# Patient Record
Sex: MALE | Race: OTHER | URBAN - METROPOLITAN AREA
[De-identification: names, ages, dates, MRNs, and addresses within clinical notes are randomized per-mention and may not be internally consistent; named-entity substitution may affect disease eponyms.]

---

## 2022-04-25 ENCOUNTER — OFFICE VISIT (OUTPATIENT)
Dept: FAMILY MEDICINE CLINIC | Facility: CLINIC | Age: 62
End: 2022-04-25
Payer: COMMERCIAL

## 2022-04-25 VITALS
SYSTOLIC BLOOD PRESSURE: 118 MMHG | HEART RATE: 70 BPM | OXYGEN SATURATION: 99 % | RESPIRATION RATE: 18 BRPM | WEIGHT: 166 LBS | TEMPERATURE: 97 F | DIASTOLIC BLOOD PRESSURE: 72 MMHG | HEIGHT: 68 IN | BODY MASS INDEX: 25.16 KG/M2

## 2022-04-25 DIAGNOSIS — Z12.11 SCREENING FOR COLON CANCER: ICD-10-CM

## 2022-04-25 DIAGNOSIS — Z12.5 SCREENING FOR MALIGNANT NEOPLASM OF PROSTATE: ICD-10-CM

## 2022-04-25 DIAGNOSIS — Z11.59 ENCOUNTER FOR HEPATITIS C SCREENING TEST FOR LOW RISK PATIENT: ICD-10-CM

## 2022-04-25 DIAGNOSIS — E11.65 TYPE 2 DIABETES MELLITUS WITH HYPERGLYCEMIA, WITH LONG-TERM CURRENT USE OF INSULIN (HCC): ICD-10-CM

## 2022-04-25 DIAGNOSIS — E78.00 PURE HYPERCHOLESTEROLEMIA: ICD-10-CM

## 2022-04-25 DIAGNOSIS — Z00.00 WELLNESS EXAMINATION: Primary | ICD-10-CM

## 2022-04-25 DIAGNOSIS — Z79.4 TYPE 2 DIABETES MELLITUS WITH HYPERGLYCEMIA, WITH LONG-TERM CURRENT USE OF INSULIN (HCC): ICD-10-CM

## 2022-04-25 DIAGNOSIS — R25.2 LEG CRAMPS: ICD-10-CM

## 2022-04-25 DIAGNOSIS — I10 ESSENTIAL HYPERTENSION: ICD-10-CM

## 2022-04-25 DIAGNOSIS — I73.9 PERIPHERAL VASCULAR DISEASE (HCC): ICD-10-CM

## 2022-04-25 PROBLEM — U07.1 COVID-19 VIRUS DETECTED: Status: ACTIVE | Noted: 2022-04-25

## 2022-04-25 PROBLEM — E10.9 TYPE 1 DIABETES MELLITUS WITHOUT COMPLICATION (HCC): Status: ACTIVE | Noted: 2022-04-25

## 2022-04-25 PROBLEM — K13.21: Status: ACTIVE | Noted: 2022-04-25

## 2022-04-25 PROBLEM — R53.83 FATIGUE: Status: RESOLVED | Noted: 2022-04-25 | Resolved: 2022-04-25

## 2022-04-25 PROBLEM — K13.21: Status: RESOLVED | Noted: 2022-04-25 | Resolved: 2022-04-25

## 2022-04-25 PROBLEM — R53.83 FATIGUE: Status: ACTIVE | Noted: 2022-04-25

## 2022-04-25 PROBLEM — U07.1 COVID-19 VIRUS DETECTED: Status: RESOLVED | Noted: 2022-04-25 | Resolved: 2022-04-25

## 2022-04-25 PROBLEM — N52.9 MALE ERECTILE DISORDER: Status: ACTIVE | Noted: 2022-04-25

## 2022-04-25 RX ORDER — HUMAN INSULIN 100 [IU]/ML
20 INJECTION, SUSPENSION SUBCUTANEOUS
COMMUNITY

## 2022-04-25 RX ORDER — ASPIRIN 81 MG/1
81 TABLET, CHEWABLE ORAL DAILY
COMMUNITY

## 2022-04-25 RX ORDER — FLASH GLUCOSE SENSOR
1 KIT MISCELLANEOUS DAILY
Qty: 6 EACH | Refills: 1 | Status: SHIPPED | OUTPATIENT
Start: 2022-04-25

## 2022-04-25 RX ORDER — ROSUVASTATIN CALCIUM 5 MG/1
TABLET, COATED ORAL
COMMUNITY
Start: 2022-02-06

## 2022-04-25 RX ORDER — OMEGA-3S/DHA/EPA/FISH OIL/D3 1150-1000
LIQUID (ML) ORAL DAILY
COMMUNITY

## 2022-04-25 RX ORDER — MULTIVIT-MIN/FOLIC ACID/LUTEIN 400-250MCG
1 TABLET,CHEWABLE ORAL DAILY
COMMUNITY

## 2022-04-25 RX ORDER — LISINOPRIL 10 MG/1
TABLET ORAL
COMMUNITY
Start: 2022-02-06

## 2022-04-25 RX ORDER — FLASH GLUCOSE SCANNING READER
1 EACH MISCELLANEOUS DAILY
Qty: 1 EACH | Refills: 0 | Status: SHIPPED | OUTPATIENT
Start: 2022-04-25

## 2022-04-26 ENCOUNTER — TELEPHONE (OUTPATIENT)
Dept: ENDOCRINOLOGY CLINIC | Facility: CLINIC | Age: 62
End: 2022-04-26

## 2022-04-26 RX ORDER — FLASH GLUCOSE SENSOR
1 KIT MISCELLANEOUS
Qty: 2 EACH | Refills: 11 | Status: SHIPPED | OUTPATIENT
Start: 2022-04-26

## 2022-04-26 NOTE — TELEPHONE ENCOUNTER
Diabetes Education - LMTCB and sched MNT. Also - need to change order for sensors from 14 day to Gaona 2 sensors. It is confusing: their original system was called \"14 day\" but the Gaona 2 system (which also lasts x 14 days) is their latest.    Pended the sensors for Gaona 2 and cancelled 14 days sensors for your convenience.

## 2022-04-30 ENCOUNTER — LAB ENCOUNTER (OUTPATIENT)
Dept: LAB | Age: 62
End: 2022-04-30
Attending: FAMILY MEDICINE
Payer: COMMERCIAL

## 2022-04-30 DIAGNOSIS — E11.65 TYPE 2 DIABETES MELLITUS WITH HYPERGLYCEMIA, WITH LONG-TERM CURRENT USE OF INSULIN (HCC): ICD-10-CM

## 2022-04-30 DIAGNOSIS — Z11.59 ENCOUNTER FOR HEPATITIS C SCREENING TEST FOR LOW RISK PATIENT: ICD-10-CM

## 2022-04-30 DIAGNOSIS — Z12.5 SCREENING FOR MALIGNANT NEOPLASM OF PROSTATE: ICD-10-CM

## 2022-04-30 DIAGNOSIS — E78.00 PURE HYPERCHOLESTEROLEMIA: ICD-10-CM

## 2022-04-30 DIAGNOSIS — Z79.4 TYPE 2 DIABETES MELLITUS WITH HYPERGLYCEMIA, WITH LONG-TERM CURRENT USE OF INSULIN (HCC): ICD-10-CM

## 2022-04-30 DIAGNOSIS — R25.2 LEG CRAMPS: ICD-10-CM

## 2022-04-30 LAB
ALBUMIN SERPL-MCNC: 3.9 G/DL (ref 3.4–5)
ALBUMIN/GLOB SERPL: 1.4 {RATIO} (ref 1–2)
ALP LIVER SERPL-CCNC: 86 U/L
ALT SERPL-CCNC: 30 U/L
ANION GAP SERPL CALC-SCNC: 3 MMOL/L (ref 0–18)
AST SERPL-CCNC: 22 U/L (ref 15–37)
BASOPHILS # BLD AUTO: 0.04 X10(3) UL (ref 0–0.2)
BASOPHILS NFR BLD AUTO: 0.9 %
BILIRUB SERPL-MCNC: 0.9 MG/DL (ref 0.1–2)
BUN BLD-MCNC: 28 MG/DL (ref 7–18)
CALCIUM BLD-MCNC: 9.1 MG/DL (ref 8.5–10.1)
CHLORIDE SERPL-SCNC: 106 MMOL/L (ref 98–112)
CHOLEST SERPL-MCNC: 154 MG/DL (ref ?–200)
CO2 SERPL-SCNC: 30 MMOL/L (ref 21–32)
COMPLEXED PSA SERPL-MCNC: 0.87 NG/ML (ref ?–4)
CREAT BLD-MCNC: 1.12 MG/DL
CREAT UR-SCNC: 140 MG/DL
EOSINOPHIL # BLD AUTO: 0.18 X10(3) UL (ref 0–0.7)
EOSINOPHIL NFR BLD AUTO: 4 %
ERYTHROCYTE [DISTWIDTH] IN BLOOD BY AUTOMATED COUNT: 14.2 %
EST. AVERAGE GLUCOSE BLD GHB EST-MCNC: 194 MG/DL (ref 68–126)
FASTING PATIENT LIPID ANSWER: YES
FASTING STATUS PATIENT QL REPORTED: YES
GLOBULIN PLAS-MCNC: 2.8 G/DL (ref 2.8–4.4)
GLUCOSE BLD-MCNC: 253 MG/DL (ref 70–99)
HBA1C MFR BLD: 8.4 % (ref ?–5.7)
HCT VFR BLD AUTO: 47.6 %
HCV AB SERPL QL IA: NONREACTIVE
HDLC SERPL-MCNC: 57 MG/DL (ref 40–59)
HGB BLD-MCNC: 15.2 G/DL
IMM GRANULOCYTES # BLD AUTO: 0.01 X10(3) UL (ref 0–1)
IMM GRANULOCYTES NFR BLD: 0.2 %
LDLC SERPL CALC-MCNC: 80 MG/DL (ref ?–100)
LYMPHOCYTES # BLD AUTO: 1.29 X10(3) UL (ref 1–4)
LYMPHOCYTES NFR BLD AUTO: 28.5 %
MAGNESIUM SERPL-MCNC: 2 MG/DL (ref 1.6–2.6)
MCH RBC QN AUTO: 30 PG (ref 26–34)
MCHC RBC AUTO-ENTMCNC: 31.9 G/DL (ref 31–37)
MCV RBC AUTO: 94.1 FL
MICROALBUMIN UR-MCNC: 0.7 MG/DL
MICROALBUMIN/CREAT 24H UR-RTO: 5 UG/MG (ref ?–30)
MONOCYTES # BLD AUTO: 0.36 X10(3) UL (ref 0.1–1)
MONOCYTES NFR BLD AUTO: 7.9 %
NEUTROPHILS # BLD AUTO: 2.65 X10 (3) UL (ref 1.5–7.7)
NEUTROPHILS # BLD AUTO: 2.65 X10(3) UL (ref 1.5–7.7)
NEUTROPHILS NFR BLD AUTO: 58.5 %
NONHDLC SERPL-MCNC: 97 MG/DL (ref ?–130)
OSMOLALITY SERPL CALC.SUM OF ELEC: 302 MOSM/KG (ref 275–295)
PLATELET # BLD AUTO: 235 10(3)UL (ref 150–450)
POTASSIUM SERPL-SCNC: 5.7 MMOL/L (ref 3.5–5.1)
PROT SERPL-MCNC: 6.7 G/DL (ref 6.4–8.2)
RBC # BLD AUTO: 5.06 X10(6)UL
SODIUM SERPL-SCNC: 139 MMOL/L (ref 136–145)
TRIGL SERPL-MCNC: 91 MG/DL (ref 30–149)
TSI SER-ACNC: 1.39 MIU/ML (ref 0.36–3.74)
VLDLC SERPL CALC-MCNC: 14 MG/DL (ref 0–30)
WBC # BLD AUTO: 4.5 X10(3) UL (ref 4–11)

## 2022-04-30 PROCEDURE — 85025 COMPLETE CBC W/AUTO DIFF WBC: CPT

## 2022-04-30 PROCEDURE — 84443 ASSAY THYROID STIM HORMONE: CPT

## 2022-04-30 PROCEDURE — 82570 ASSAY OF URINE CREATININE: CPT

## 2022-04-30 PROCEDURE — 80061 LIPID PANEL: CPT

## 2022-04-30 PROCEDURE — 36415 COLL VENOUS BLD VENIPUNCTURE: CPT

## 2022-04-30 PROCEDURE — 82043 UR ALBUMIN QUANTITATIVE: CPT

## 2022-04-30 PROCEDURE — 83735 ASSAY OF MAGNESIUM: CPT

## 2022-04-30 PROCEDURE — 86803 HEPATITIS C AB TEST: CPT

## 2022-04-30 PROCEDURE — 80053 COMPREHEN METABOLIC PANEL: CPT

## 2022-04-30 PROCEDURE — 83036 HEMOGLOBIN GLYCOSYLATED A1C: CPT

## 2022-05-03 ENCOUNTER — TELEPHONE (OUTPATIENT)
Dept: FAMILY MEDICINE CLINIC | Facility: CLINIC | Age: 62
End: 2022-05-03

## 2022-05-03 NOTE — TELEPHONE ENCOUNTER
----- Message from Dallin Porter DO sent at 5/2/2022  4:32 PM CDT -----  Please notify the patient that his urine microalbumin, hapatitis c, lipid panel, thyroid test and blood count are normal.    His chemistry panel shows elevated glucose, potassium and BUN. He needs repeat BMP for recheck on all three as soon as possible. Increase water intake to help hydrate kidneys. HgbA1C is 8.4 which is too high. Increase Insulin NPH to 25 units daily and continue with sliding scale. Recheck HgbA1C in 3 months. Thanks.

## 2022-05-17 ENCOUNTER — LAB ENCOUNTER (OUTPATIENT)
Dept: LAB | Age: 62
End: 2022-05-17
Attending: FAMILY MEDICINE
Payer: COMMERCIAL

## 2022-05-17 DIAGNOSIS — R79.9 ELEVATED BUN: ICD-10-CM

## 2022-05-17 DIAGNOSIS — E87.5 HYPERKALEMIA: ICD-10-CM

## 2022-05-17 DIAGNOSIS — R73.9 HYPERGLYCEMIA: ICD-10-CM

## 2022-05-17 LAB
ANION GAP SERPL CALC-SCNC: 3 MMOL/L (ref 0–18)
BUN BLD-MCNC: 25 MG/DL (ref 7–18)
CALCIUM BLD-MCNC: 8.7 MG/DL (ref 8.5–10.1)
CHLORIDE SERPL-SCNC: 109 MMOL/L (ref 98–112)
CO2 SERPL-SCNC: 28 MMOL/L (ref 21–32)
CREAT BLD-MCNC: 1.1 MG/DL
FASTING STATUS PATIENT QL REPORTED: YES
GLUCOSE BLD-MCNC: 215 MG/DL (ref 70–99)
OSMOLALITY SERPL CALC.SUM OF ELEC: 301 MOSM/KG (ref 275–295)
POTASSIUM SERPL-SCNC: 5.3 MMOL/L (ref 3.5–5.1)
SODIUM SERPL-SCNC: 140 MMOL/L (ref 136–145)

## 2022-05-17 PROCEDURE — 80048 BASIC METABOLIC PNL TOTAL CA: CPT | Performed by: FAMILY MEDICINE

## 2022-05-18 ENCOUNTER — TELEPHONE (OUTPATIENT)
Dept: FAMILY MEDICINE CLINIC | Facility: CLINIC | Age: 62
End: 2022-05-18

## 2022-05-18 NOTE — TELEPHONE ENCOUNTER
----- Message from Tracy Arango DO sent at 5/17/2022  3:33 PM CDT -----  Please let the patient know his potassium has improved. It is still a little high at 5.3. Blood sugar was 215 and BUN has improved to 25. Continue to push fluids. Patient to make an appointment with the diabetic educator regarding use of his Freestyle valentine CGM and with his eye doctor. Call the office in 2 weeks with updated home BS readings after the increase in the NPH dosage.

## 2022-06-27 ENCOUNTER — TELEPHONE (OUTPATIENT)
Dept: FAMILY MEDICINE CLINIC | Facility: CLINIC | Age: 62
End: 2022-06-27

## 2022-06-27 PROBLEM — Z12.11 SPECIAL SCREENING FOR MALIGNANT NEOPLASM OF COLON: Status: ACTIVE | Noted: 2022-06-27

## 2022-06-27 NOTE — TELEPHONE ENCOUNTER
Colonoscopy report dated 6/27/2022    Normal colon. Internal hemorrhoids    Next colonoscopy in 10 years.

## 2022-07-18 ENCOUNTER — OFFICE VISIT (OUTPATIENT)
Dept: FAMILY MEDICINE CLINIC | Facility: CLINIC | Age: 62
End: 2022-07-18
Payer: COMMERCIAL

## 2022-07-18 VITALS
DIASTOLIC BLOOD PRESSURE: 62 MMHG | WEIGHT: 171 LBS | BODY MASS INDEX: 25.91 KG/M2 | HEART RATE: 68 BPM | TEMPERATURE: 98 F | SYSTOLIC BLOOD PRESSURE: 118 MMHG | OXYGEN SATURATION: 100 % | RESPIRATION RATE: 18 BRPM | HEIGHT: 68 IN

## 2022-07-18 DIAGNOSIS — Z79.4 TYPE 2 DIABETES MELLITUS WITHOUT COMPLICATION, WITH LONG-TERM CURRENT USE OF INSULIN (HCC): Primary | ICD-10-CM

## 2022-07-18 DIAGNOSIS — E78.00 PURE HYPERCHOLESTEROLEMIA: ICD-10-CM

## 2022-07-18 DIAGNOSIS — I10 ESSENTIAL HYPERTENSION: ICD-10-CM

## 2022-07-18 DIAGNOSIS — E87.5 HYPERKALEMIA: ICD-10-CM

## 2022-07-18 DIAGNOSIS — E11.9 TYPE 2 DIABETES MELLITUS WITHOUT COMPLICATION, WITH LONG-TERM CURRENT USE OF INSULIN (HCC): Primary | ICD-10-CM

## 2022-07-18 LAB
ALBUMIN SERPL-MCNC: 3.8 G/DL (ref 3.4–5)
ALBUMIN/GLOB SERPL: 1.4 {RATIO} (ref 1–2)
ALP LIVER SERPL-CCNC: 89 U/L
ALT SERPL-CCNC: 29 U/L
ANION GAP SERPL CALC-SCNC: 2 MMOL/L (ref 0–18)
AST SERPL-CCNC: 30 U/L (ref 15–37)
BILIRUB SERPL-MCNC: 0.3 MG/DL (ref 0.1–2)
BUN BLD-MCNC: 22 MG/DL (ref 7–18)
CALCIUM BLD-MCNC: 8.6 MG/DL (ref 8.5–10.1)
CARTRIDGE LOT#: 928 NUMERIC
CHLORIDE SERPL-SCNC: 109 MMOL/L (ref 98–112)
CO2 SERPL-SCNC: 28 MMOL/L (ref 21–32)
CREAT BLD-MCNC: 1.23 MG/DL
FASTING STATUS PATIENT QL REPORTED: NO
GLOBULIN PLAS-MCNC: 2.7 G/DL (ref 2.8–4.4)
GLUCOSE BLD-MCNC: 152 MG/DL (ref 70–99)
HEMOGLOBIN A1C: 7.6 % (ref 4.3–5.6)
OSMOLALITY SERPL CALC.SUM OF ELEC: 294 MOSM/KG (ref 275–295)
POTASSIUM SERPL-SCNC: 4.7 MMOL/L (ref 3.5–5.1)
PROT SERPL-MCNC: 6.5 G/DL (ref 6.4–8.2)
SODIUM SERPL-SCNC: 139 MMOL/L (ref 136–145)

## 2022-07-18 PROCEDURE — 80053 COMPREHEN METABOLIC PANEL: CPT | Performed by: FAMILY MEDICINE

## 2022-07-18 RX ORDER — FLASH GLUCOSE SENSOR
1 KIT MISCELLANEOUS CONTINUOUS
Qty: 1 EACH | Refills: 0 | Status: SHIPPED | OUTPATIENT
Start: 2022-07-18

## 2022-07-18 RX ORDER — LISINOPRIL 10 MG/1
10 TABLET ORAL DAILY
Qty: 90 TABLET | Refills: 1 | Status: SHIPPED | OUTPATIENT
Start: 2022-07-18

## 2022-07-18 RX ORDER — ROSUVASTATIN CALCIUM 5 MG/1
5 TABLET, COATED ORAL NIGHTLY
Qty: 90 TABLET | Refills: 1 | Status: SHIPPED | OUTPATIENT
Start: 2022-07-18

## 2022-07-18 NOTE — PATIENT INSTRUCTIONS
Hold your nighttime regular insulin for the next week. Let me know if you continue to have low blood sugars during the night over the next week. I will contact you with your test results. Keep your appointment with your eye doctor. Go for your second COVID booster. Consider getting your Shingles vaccine and your pneumonia vaccine. See me in 3 months or sooner if you continue to have problems with low blood sugars.

## 2022-10-17 ENCOUNTER — OFFICE VISIT (OUTPATIENT)
Dept: FAMILY MEDICINE CLINIC | Facility: CLINIC | Age: 62
End: 2022-10-17
Payer: COMMERCIAL

## 2022-10-17 VITALS
BODY MASS INDEX: 26.07 KG/M2 | HEART RATE: 70 BPM | SYSTOLIC BLOOD PRESSURE: 132 MMHG | RESPIRATION RATE: 16 BRPM | TEMPERATURE: 97 F | OXYGEN SATURATION: 98 % | WEIGHT: 172 LBS | DIASTOLIC BLOOD PRESSURE: 86 MMHG | HEIGHT: 68 IN

## 2022-10-17 DIAGNOSIS — M77.11 LATERAL EPICONDYLITIS OF RIGHT ELBOW: ICD-10-CM

## 2022-10-17 DIAGNOSIS — Z79.4 TYPE 2 DIABETES MELLITUS WITHOUT COMPLICATION, WITH LONG-TERM CURRENT USE OF INSULIN (HCC): Primary | ICD-10-CM

## 2022-10-17 DIAGNOSIS — I10 ESSENTIAL HYPERTENSION: ICD-10-CM

## 2022-10-17 DIAGNOSIS — E11.9 TYPE 2 DIABETES MELLITUS WITHOUT COMPLICATION, WITH LONG-TERM CURRENT USE OF INSULIN (HCC): Primary | ICD-10-CM

## 2022-10-17 DIAGNOSIS — E78.00 PURE HYPERCHOLESTEROLEMIA: ICD-10-CM

## 2022-10-17 DIAGNOSIS — Z23 NEED FOR VACCINATION AGAINST STREPTOCOCCUS PNEUMONIAE: ICD-10-CM

## 2022-10-17 DIAGNOSIS — S69.92XA INJURY OF LEFT THUMB, INITIAL ENCOUNTER: ICD-10-CM

## 2022-10-17 LAB
ALBUMIN SERPL-MCNC: 4.1 G/DL (ref 3.4–5)
ALBUMIN/GLOB SERPL: 1.3 {RATIO} (ref 1–2)
ALP LIVER SERPL-CCNC: 83 U/L
ALT SERPL-CCNC: 30 U/L
ANION GAP SERPL CALC-SCNC: 4 MMOL/L (ref 0–18)
AST SERPL-CCNC: 23 U/L (ref 15–37)
BILIRUB SERPL-MCNC: 0.5 MG/DL (ref 0.1–2)
BUN BLD-MCNC: 27 MG/DL (ref 7–18)
CALCIUM BLD-MCNC: 9.2 MG/DL (ref 8.5–10.1)
CHLORIDE SERPL-SCNC: 109 MMOL/L (ref 98–112)
CO2 SERPL-SCNC: 29 MMOL/L (ref 21–32)
CREAT BLD-MCNC: 1.13 MG/DL
EST. AVERAGE GLUCOSE BLD GHB EST-MCNC: 174 MG/DL (ref 68–126)
FASTING STATUS PATIENT QL REPORTED: NO
GFR SERPLBLD BASED ON 1.73 SQ M-ARVRAT: 73 ML/MIN/1.73M2 (ref 60–?)
GLOBULIN PLAS-MCNC: 3.1 G/DL (ref 2.8–4.4)
GLUCOSE BLD-MCNC: 134 MG/DL (ref 70–99)
HBA1C MFR BLD: 7.7 % (ref ?–5.7)
OSMOLALITY SERPL CALC.SUM OF ELEC: 301 MOSM/KG (ref 275–295)
POTASSIUM SERPL-SCNC: 4.6 MMOL/L (ref 3.5–5.1)
PROT SERPL-MCNC: 7.2 G/DL (ref 6.4–8.2)
SODIUM SERPL-SCNC: 142 MMOL/L (ref 136–145)

## 2022-10-17 PROCEDURE — 3008F BODY MASS INDEX DOCD: CPT | Performed by: FAMILY MEDICINE

## 2022-10-17 PROCEDURE — 80053 COMPREHEN METABOLIC PANEL: CPT | Performed by: FAMILY MEDICINE

## 2022-10-17 PROCEDURE — 3079F DIAST BP 80-89 MM HG: CPT | Performed by: FAMILY MEDICINE

## 2022-10-17 PROCEDURE — 99214 OFFICE O/P EST MOD 30 MIN: CPT | Performed by: FAMILY MEDICINE

## 2022-10-17 PROCEDURE — 83036 HEMOGLOBIN GLYCOSYLATED A1C: CPT | Performed by: FAMILY MEDICINE

## 2022-10-17 PROCEDURE — 3075F SYST BP GE 130 - 139MM HG: CPT | Performed by: FAMILY MEDICINE

## 2022-10-17 PROCEDURE — 90471 IMMUNIZATION ADMIN: CPT | Performed by: FAMILY MEDICINE

## 2022-10-17 PROCEDURE — 90677 PCV20 VACCINE IM: CPT | Performed by: FAMILY MEDICINE

## 2022-10-17 NOTE — PATIENT INSTRUCTIONS
Continue your medications as prescribed. Have your eye doctor send me a copy of your most recent office visit note. Go for your xray of your left thumb. Wear the tennis elbow strap on your right arm during the day and take it off for sleeping to help support the tendons of the forearm muscles. Apply to the right elbow 10-15 minutes twice daily. Always ice through clothing or a towel, never on bare skin to avoid frost bite. Take your Naproxen as needed for pain. Contact the office if your elbow pain does not improve over the next 3-4 weeks at which point I will refer you to orthopedics. You received the Prevnar 20 (pneumonia) vaccine today. You may run a low grade fever, have mild redness or swelling at the site of the shot, muscle pain at the site of the shot for the next 2-3 days. You may take Tylenol or Ibuprofen as needed. Use your arm to help decrease pain and swelling. You can apply ice to any swelling for 10-15 minutes twice daily through clothing or a towel. Get the new COVID booster. See me in 3 months for recheck on your blood pressure and diabetes.

## 2022-11-25 ENCOUNTER — TELEPHONE (OUTPATIENT)
Dept: FAMILY MEDICINE CLINIC | Facility: CLINIC | Age: 62
End: 2022-11-25

## 2022-11-25 LAB — AMB EXT COVID-19 RESULT: DETECTED

## 2022-11-25 NOTE — TELEPHONE ENCOUNTER
paxlovid sent to his pharmacy. He needs to stop the cholesterol medication while on the paxlovid. To ER if any chest pain, increased difficulty breathing, coughing up blood or any other worsening symptoms. Please give quantantine guidelines.

## 2022-11-25 NOTE — TELEPHONE ENCOUNTER
Tested positive for COVID-19. Symptoms began yesterday. Mild cough, sinus congestion, body aches. He is interested in Paxlovid if you believe appropriate. Med list is up to date.

## 2022-11-25 NOTE — TELEPHONE ENCOUNTER
Tp states positive for COVID   Aches, a little cough with phlegm and body aches. No fevers or chills.  Requesting advice

## 2023-01-24 ENCOUNTER — OFFICE VISIT (OUTPATIENT)
Dept: FAMILY MEDICINE CLINIC | Facility: CLINIC | Age: 63
End: 2023-01-24
Payer: COMMERCIAL

## 2023-01-24 VITALS
OXYGEN SATURATION: 98 % | WEIGHT: 176 LBS | TEMPERATURE: 98 F | HEART RATE: 69 BPM | DIASTOLIC BLOOD PRESSURE: 82 MMHG | SYSTOLIC BLOOD PRESSURE: 118 MMHG | BODY MASS INDEX: 27 KG/M2

## 2023-01-24 DIAGNOSIS — M77.11 LATERAL EPICONDYLITIS OF RIGHT ELBOW: ICD-10-CM

## 2023-01-24 DIAGNOSIS — Z79.4 TYPE 2 DIABETES MELLITUS WITH HYPERGLYCEMIA, WITH LONG-TERM CURRENT USE OF INSULIN (HCC): ICD-10-CM

## 2023-01-24 DIAGNOSIS — I10 ESSENTIAL HYPERTENSION: Primary | ICD-10-CM

## 2023-01-24 DIAGNOSIS — E11.65 TYPE 2 DIABETES MELLITUS WITH HYPERGLYCEMIA, WITH LONG-TERM CURRENT USE OF INSULIN (HCC): ICD-10-CM

## 2023-01-24 DIAGNOSIS — E78.00 PURE HYPERCHOLESTEROLEMIA: ICD-10-CM

## 2023-01-24 PROCEDURE — 3079F DIAST BP 80-89 MM HG: CPT | Performed by: FAMILY MEDICINE

## 2023-01-24 PROCEDURE — 3074F SYST BP LT 130 MM HG: CPT | Performed by: FAMILY MEDICINE

## 2023-01-24 PROCEDURE — 99214 OFFICE O/P EST MOD 30 MIN: CPT | Performed by: FAMILY MEDICINE

## 2023-01-24 RX ORDER — ROSUVASTATIN CALCIUM 5 MG/1
5 TABLET, COATED ORAL NIGHTLY
Qty: 90 TABLET | Refills: 1 | Status: SHIPPED | OUTPATIENT
Start: 2023-01-24

## 2023-01-24 RX ORDER — LISINOPRIL 10 MG/1
10 TABLET ORAL DAILY
Qty: 90 TABLET | Refills: 1 | Status: SHIPPED | OUTPATIENT
Start: 2023-01-24

## 2023-01-24 NOTE — PATIENT INSTRUCTIONS
Make an appointment with the diabetic clinic to help adjust your insulin for better control of your blood sugar. Do not drink orange juice when taking your medication. Drink something sugar free. Do not walk barefoot in the house.     See me in April for your annual physical.

## 2023-04-24 ENCOUNTER — OFFICE VISIT (OUTPATIENT)
Dept: FAMILY MEDICINE CLINIC | Facility: CLINIC | Age: 63
End: 2023-04-24
Payer: COMMERCIAL

## 2023-04-24 VITALS
DIASTOLIC BLOOD PRESSURE: 70 MMHG | BODY MASS INDEX: 25.91 KG/M2 | SYSTOLIC BLOOD PRESSURE: 112 MMHG | WEIGHT: 171 LBS | RESPIRATION RATE: 18 BRPM | HEIGHT: 68 IN | HEART RATE: 76 BPM | TEMPERATURE: 98 F | OXYGEN SATURATION: 99 %

## 2023-04-24 DIAGNOSIS — I10 ESSENTIAL HYPERTENSION: Chronic | ICD-10-CM

## 2023-04-24 DIAGNOSIS — I73.9 PERIPHERAL VASCULAR DISEASE (HCC): Chronic | ICD-10-CM

## 2023-04-24 DIAGNOSIS — Z00.00 WELLNESS EXAMINATION: Primary | ICD-10-CM

## 2023-04-24 DIAGNOSIS — E11.65 TYPE 2 DIABETES MELLITUS WITH HYPERGLYCEMIA, WITH LONG-TERM CURRENT USE OF INSULIN (HCC): ICD-10-CM

## 2023-04-24 DIAGNOSIS — E78.00 PURE HYPERCHOLESTEROLEMIA: ICD-10-CM

## 2023-04-24 DIAGNOSIS — Z79.4 TYPE 2 DIABETES MELLITUS WITH HYPERGLYCEMIA, WITH LONG-TERM CURRENT USE OF INSULIN (HCC): ICD-10-CM

## 2023-04-24 DIAGNOSIS — Z00.00 LABORATORY EXAM ORDERED AS PART OF ROUTINE GENERAL MEDICAL EXAMINATION: ICD-10-CM

## 2023-04-24 NOTE — PATIENT INSTRUCTIONS
Go for your fasting blood tests. Do not eat or drink except for water for at least 8 hours prior to the blood tests. Schedule your eye exam in September. Continue your medications as prescribed. For premenopausal women and men, 1000 mg of calcium daily is recommended. For postmenopausal women, 1200 mg of calcium daily is recommended. To help the body absorb and use calcium, vitamin D 2000 international units daily is recommended. I will contact you with your test results once available.

## 2023-04-27 ENCOUNTER — TELEPHONE (OUTPATIENT)
Dept: FAMILY MEDICINE CLINIC | Facility: CLINIC | Age: 63
End: 2023-04-27

## 2023-04-27 RX ORDER — BLOOD-GLUCOSE SENSOR
1 EACH MISCELLANEOUS
Qty: 6 EACH | Refills: 1 | Status: SHIPPED | OUTPATIENT
Start: 2023-04-27

## 2023-04-27 NOTE — TELEPHONE ENCOUNTER
Pt walked in requesting a new prescription for Freestyle Gilma 3 sent [facility] [employer] on file. Pt seen on Monday and forgot to ask. Pt was sent a voucher to try device. Pt can be reached at [phone] with any questions.

## 2023-05-14 LAB
ABSOLUTE BASOPHILS: 18 CELLS/UL (ref 0–200)
ABSOLUTE EOSINOPHILS: 60 CELLS/UL (ref 15–500)
ABSOLUTE LYMPHOCYTES: 1416 CELLS/UL (ref 850–3900)
ABSOLUTE MONOCYTES: 480 CELLS/UL (ref 200–950)
ABSOLUTE NEUTROPHILS: 4026 CELLS/UL (ref 1500–7800)
ALBUMIN/GLOBULIN RATIO: 1.9 (CALC) (ref 1–2.5)
ALBUMIN: 4.3 G/DL (ref 3.6–5.1)
ALKALINE PHOSPHATASE: 77 U/L (ref 35–144)
ALT: 20 U/L (ref 9–46)
AST: 19 U/L (ref 10–35)
BASOPHILS: 0.3 %
BILIRUBIN, TOTAL: 0.9 MG/DL (ref 0.2–1.2)
BUN/CREATININE RATIO: 31 (CALC) (ref 6–22)
BUN: 31 MG/DL (ref 7–25)
CALCIUM: 9.3 MG/DL (ref 8.6–10.3)
CARBON DIOXIDE: 29 MMOL/L (ref 20–32)
CHLORIDE: 103 MMOL/L (ref 98–110)
CHOL/HDLC RATIO: 2.7 (CALC)
CHOLESTEROL, TOTAL: 175 MG/DL
CREATININE, RANDOM URINE: 161 MG/DL (ref 20–320)
CREATININE: 1 MG/DL (ref 0.7–1.35)
EGFR: 85 ML/MIN/1.73M2
EOSINOPHILS: 1 %
GLOBULIN: 2.3 G/DL (CALC) (ref 1.9–3.7)
GLUCOSE: 176 MG/DL (ref 65–99)
HDL CHOLESTEROL: 66 MG/DL
HEMATOCRIT: 47.6 % (ref 38.5–50)
HEMOGLOBIN A1C: 7.2 % OF TOTAL HGB
HEMOGLOBIN: 15.4 G/DL (ref 13.2–17.1)
LDL-CHOLESTEROL: 93 MG/DL (CALC)
LYMPHOCYTES: 23.6 %
MCH: 29.6 PG (ref 27–33)
MCHC: 32.4 G/DL (ref 32–36)
MCV: 91.5 FL (ref 80–100)
MICROALBUMIN/CREATININE RATIO, RANDOM URINE: 5 MCG/MG CREAT
MICROALBUMIN: 0.8 MG/DL
MONOCYTES: 8 %
MPV: 10.5 FL (ref 7.5–12.5)
NEUTROPHILS: 67.1 %
NON-HDL CHOLESTEROL: 109 MG/DL (CALC)
PLATELET COUNT: 228 THOUSAND/UL (ref 140–400)
POTASSIUM: 5.4 MMOL/L (ref 3.5–5.3)
PROTEIN, TOTAL: 6.6 G/DL (ref 6.1–8.1)
RDW: 13.3 % (ref 11–15)
RED BLOOD CELL COUNT: 5.2 MILLION/UL (ref 4.2–5.8)
SODIUM: 139 MMOL/L (ref 135–146)
TRIGLYCERIDES: 69 MG/DL
TSH W/REFLEX TO FT4: 1.45 MIU/L (ref 0.4–4.5)
WHITE BLOOD CELL COUNT: 6 THOUSAND/UL (ref 3.8–10.8)

## 2023-06-01 ENCOUNTER — TELEPHONE (OUTPATIENT)
Dept: FAMILY MEDICINE CLINIC | Facility: CLINIC | Age: 63
End: 2023-06-01

## 2023-06-01 DIAGNOSIS — Z79.4 TYPE 2 DIABETES MELLITUS WITH HYPERGLYCEMIA, WITH LONG-TERM CURRENT USE OF INSULIN (HCC): Primary | ICD-10-CM

## 2023-06-01 DIAGNOSIS — E11.9 TYPE 2 DIABETES MELLITUS WITHOUT COMPLICATION, WITH LONG-TERM CURRENT USE OF INSULIN (HCC): ICD-10-CM

## 2023-06-01 DIAGNOSIS — Z79.4 TYPE 2 DIABETES MELLITUS WITHOUT COMPLICATION, WITH LONG-TERM CURRENT USE OF INSULIN (HCC): ICD-10-CM

## 2023-06-01 DIAGNOSIS — E11.65 TYPE 2 DIABETES MELLITUS WITH HYPERGLYCEMIA, WITH LONG-TERM CURRENT USE OF INSULIN (HCC): Primary | ICD-10-CM

## 2023-06-01 RX ORDER — BLOOD-GLUCOSE SENSOR
1 EACH MISCELLANEOUS
Qty: 6 EACH | Refills: 3 | Status: SHIPPED | OUTPATIENT
Start: 2023-06-01

## 2023-06-01 NOTE — TELEPHONE ENCOUNTER
Pt calling requesting Humlin R insulin prescription. Pt says in order for his insurance to cover Jennifer Marksmar 112 3 SENSOR he needs a prescription for insulin sent to the [facility] in [employer] on file. Pt would like a call once this has been completed. Please advise. Thank you.

## 2023-06-01 NOTE — TELEPHONE ENCOUNTER
I sent the prescription for the regular insulin and the Freestyle valentine 3 sensor to [facility] [employer] [facility].

## 2023-08-25 DIAGNOSIS — E11.65 TYPE 2 DIABETES MELLITUS WITH HYPERGLYCEMIA, WITH LONG-TERM CURRENT USE OF INSULIN (HCC): ICD-10-CM

## 2023-08-25 DIAGNOSIS — Z79.4 TYPE 2 DIABETES MELLITUS WITH HYPERGLYCEMIA, WITH LONG-TERM CURRENT USE OF INSULIN (HCC): ICD-10-CM

## 2023-10-16 DIAGNOSIS — E78.00 PURE HYPERCHOLESTEROLEMIA: ICD-10-CM

## 2023-10-16 DIAGNOSIS — I10 ESSENTIAL HYPERTENSION: ICD-10-CM

## 2023-10-17 RX ORDER — LISINOPRIL 10 MG/1
10 TABLET ORAL DAILY
Qty: 90 TABLET | Refills: 3 | Status: SHIPPED | OUTPATIENT
Start: 2023-10-17

## 2023-10-17 RX ORDER — ROSUVASTATIN CALCIUM 5 MG/1
5 TABLET, COATED ORAL NIGHTLY
Qty: 90 TABLET | Refills: 3 | Status: SHIPPED | OUTPATIENT
Start: 2023-10-17

## 2023-10-17 NOTE — TELEPHONE ENCOUNTER
Recent Visits  Date Type Provider Dept   04/24/23 Office Visit Audra Mitchell DO [facility]     Future Appointments  Date Type Provider Dept   10/24/23 Appointment Audra Mitchell DO [facility]

## 2023-10-24 ENCOUNTER — OFFICE VISIT (OUTPATIENT)
Dept: FAMILY MEDICINE CLINIC | Facility: CLINIC | Age: 63
End: 2023-10-24

## 2023-10-24 VITALS
BODY MASS INDEX: 25.61 KG/M2 | DIASTOLIC BLOOD PRESSURE: 68 MMHG | TEMPERATURE: 98 F | HEIGHT: 68 IN | RESPIRATION RATE: 18 BRPM | OXYGEN SATURATION: 100 % | HEART RATE: 77 BPM | WEIGHT: 169 LBS | SYSTOLIC BLOOD PRESSURE: 128 MMHG

## 2023-10-24 DIAGNOSIS — I10 ESSENTIAL HYPERTENSION: Chronic | ICD-10-CM

## 2023-10-24 DIAGNOSIS — R20.2 PARESTHESIA OF RIGHT FOOT: ICD-10-CM

## 2023-10-24 DIAGNOSIS — Z79.4 TYPE 2 DIABETES MELLITUS WITH HYPERGLYCEMIA, WITH LONG-TERM CURRENT USE OF INSULIN (HCC): Primary | ICD-10-CM

## 2023-10-24 DIAGNOSIS — E78.00 PURE HYPERCHOLESTEROLEMIA: ICD-10-CM

## 2023-10-24 DIAGNOSIS — I73.9 PERIPHERAL VASCULAR DISEASE (HCC): Chronic | ICD-10-CM

## 2023-10-24 DIAGNOSIS — E11.65 TYPE 2 DIABETES MELLITUS WITH HYPERGLYCEMIA, WITH LONG-TERM CURRENT USE OF INSULIN (HCC): Primary | ICD-10-CM

## 2023-10-24 LAB
CARTRIDGE LOT#: 624 NUMERIC
HEMOGLOBIN A1C: 7.1 % (ref 4.3–5.6)

## 2023-10-24 PROCEDURE — 3008F BODY MASS INDEX DOCD: CPT | Performed by: FAMILY MEDICINE

## 2023-10-24 PROCEDURE — 83036 HEMOGLOBIN GLYCOSYLATED A1C: CPT | Performed by: FAMILY MEDICINE

## 2023-10-24 PROCEDURE — 3051F HG A1C>EQUAL 7.0%<8.0%: CPT | Performed by: FAMILY MEDICINE

## 2023-10-24 PROCEDURE — 3074F SYST BP LT 130 MM HG: CPT | Performed by: FAMILY MEDICINE

## 2023-10-24 PROCEDURE — 99214 OFFICE O/P EST MOD 30 MIN: CPT | Performed by: FAMILY MEDICINE

## 2023-10-24 PROCEDURE — 3078F DIAST BP <80 MM HG: CPT | Performed by: FAMILY MEDICINE

## 2023-10-24 RX ORDER — KETOROLAC TROMETHAMINE 5 MG/ML
SOLUTION OPHTHALMIC
COMMUNITY
Start: 2023-10-08 | End: 2023-10-24 | Stop reason: ALTCHOICE

## 2023-10-24 RX ORDER — OFLOXACIN 3 MG/ML
SOLUTION/ DROPS OPHTHALMIC
COMMUNITY
Start: 2023-09-29 | End: 2023-10-24 | Stop reason: ALTCHOICE

## 2023-10-24 RX ORDER — CEPHALEXIN 500 MG/1
CAPSULE ORAL
COMMUNITY
Start: 2023-09-22 | End: 2023-10-24 | Stop reason: ALTCHOICE

## 2023-10-24 RX ORDER — PREDNISOLONE ACETATE 10 MG/ML
SUSPENSION/ DROPS OPHTHALMIC
COMMUNITY
Start: 2023-09-29 | End: 2023-10-24 | Stop reason: ALTCHOICE

## 2024-04-30 DIAGNOSIS — E11.65 TYPE 2 DIABETES MELLITUS WITH HYPERGLYCEMIA, WITH LONG-TERM CURRENT USE OF INSULIN (HCC): ICD-10-CM

## 2024-04-30 DIAGNOSIS — Z79.4 TYPE 2 DIABETES MELLITUS WITH HYPERGLYCEMIA, WITH LONG-TERM CURRENT USE OF INSULIN (HCC): ICD-10-CM

## 2024-05-01 RX ORDER — BLOOD-GLUCOSE SENSOR
1 EACH MISCELLANEOUS
Qty: 1 EACH | Refills: 3 | Status: SHIPPED | OUTPATIENT
Start: 2024-05-01

## 2024-06-24 ENCOUNTER — OFFICE VISIT (OUTPATIENT)
Dept: FAMILY MEDICINE CLINIC | Facility: CLINIC | Age: 64
End: 2024-06-24

## 2024-06-24 VITALS
WEIGHT: 168 LBS | BODY MASS INDEX: 25.46 KG/M2 | HEART RATE: 75 BPM | OXYGEN SATURATION: 99 % | TEMPERATURE: 98 F | DIASTOLIC BLOOD PRESSURE: 66 MMHG | RESPIRATION RATE: 16 BRPM | HEIGHT: 68 IN | SYSTOLIC BLOOD PRESSURE: 106 MMHG

## 2024-06-24 DIAGNOSIS — Z23 NEED FOR TDAP VACCINATION: ICD-10-CM

## 2024-06-24 DIAGNOSIS — E11.65 TYPE 2 DIABETES MELLITUS WITH HYPERGLYCEMIA, WITH LONG-TERM CURRENT USE OF INSULIN (HCC): ICD-10-CM

## 2024-06-24 DIAGNOSIS — E78.00 PURE HYPERCHOLESTEROLEMIA: ICD-10-CM

## 2024-06-24 DIAGNOSIS — Z00.00 LABORATORY EXAM ORDERED AS PART OF ROUTINE GENERAL MEDICAL EXAMINATION: ICD-10-CM

## 2024-06-24 DIAGNOSIS — I73.9 PERIPHERAL VASCULAR DISEASE (HCC): ICD-10-CM

## 2024-06-24 DIAGNOSIS — Z79.4 TYPE 2 DIABETES MELLITUS WITH HYPERGLYCEMIA, WITH LONG-TERM CURRENT USE OF INSULIN (HCC): ICD-10-CM

## 2024-06-24 DIAGNOSIS — M79.671 RIGHT FOOT PAIN: ICD-10-CM

## 2024-06-24 DIAGNOSIS — Z00.00 WELLNESS EXAMINATION: Primary | ICD-10-CM

## 2024-06-24 DIAGNOSIS — B35.1 ONYCHOMYCOSIS: ICD-10-CM

## 2024-06-24 DIAGNOSIS — I10 ESSENTIAL HYPERTENSION: ICD-10-CM

## 2024-06-24 DIAGNOSIS — Z12.5 SCREENING FOR PROSTATE CANCER: ICD-10-CM

## 2024-06-24 RX ORDER — ROSUVASTATIN CALCIUM 5 MG/1
5 TABLET, COATED ORAL NIGHTLY
Qty: 90 TABLET | Refills: 3 | Status: SHIPPED | OUTPATIENT
Start: 2024-06-24

## 2024-06-24 RX ORDER — LISINOPRIL 10 MG/1
10 TABLET ORAL DAILY
Qty: 90 TABLET | Refills: 3 | Status: SHIPPED | OUTPATIENT
Start: 2024-06-24

## 2024-06-24 NOTE — PATIENT INSTRUCTIONS
Go to [facility] for your fasting blood tests. Do not eat or drink except for water for at least 8 hours prior to the blood tests. Do not take any vitamins or Biotin for 3 days before your blood tests.    Schedule your diabetic eye exam with your eye doctor in September. Have your doctor send me a copy of his report.    Make an appointment with the podiatrist, Dr. Haro, for follow up on your right foot discomfort.    You received the Tdap (tetanus, diptheria, pertussis-whooping cough)  vaccines today. You may run a low grade fever, have mild redness or swelling at the site of the shot, muscle pain at the site of the shot for the next 2-3 days. You may take Tylenol or Ibuprofen as needed. Use your arm to help decrease pain and swelling. You can apply ice to any swelling for 10-15 minutes twice daily through clothing or a towel.    Continue your medications as prescribed.    See me in 6 months for recheck on your diabetes.    Video miiCard  What is Type 2 Diabetes?  Watch this clip to understand what happens within your body when you have type 2 diabetes, and the importance of keeping your blood glucose levels within a healthy range.  To watch the video:  Scan the QR code  Using your mobile device, scan the following code:  OR  Go to the website:  [employer]  Enter the prescription code:  1576E    © 0144-8162 [employer]. All rights reserved. This information is not intended as a substitute for professional medical care. Always follow your healthcare professional's instructions.    Managing Type 2 Diabetes  Type 2 diabetes is a long-term (chronic) condition. Managing diabetes may mean making some tough changes. Yourhealthcare team can help you.  To manage type 2 diabetes, you'll need to balance your medicine with diet and activity. You will also need check your blood sugar. And work with yourhealthcare provider to prevent complications.    Take your medicine  You may take pills or  give yourself insulin shots for diabetes. Or you may use both. Take your medicines or give yourself insulin at the right times to help you control your blood sugar. Think about ways that will help you remember to take your medicines the right way every day. Ask your healthcare provider orteam for ideas.  You may only take pills for your diabetes. But this may change. Over time, mostpeople with type 2 diabetes also need insulin.  Eat healthy  A healthy diet helps control the amount of sugar in your blood. It also helps you stay at a healthy weight. Or it helps you lose weight, if if you'reoverweight. Extra weight makes it harder to control diabetes.  Your healthcare team will help you create a plan that works for you. You don'thave to give up all the foods you like. Have meals and snacks with:  Vegetables  Fruits  Lean meats or other healthy proteins  Whole grains  Low- or nonfat dairy products     Be physically active  Being active helps lower your blood sugar. Activity helps your body use insulinto turn food into energy. It also helps you manage your weight:  Ask your healthcare provider to help you to make an activity program that's right for you. Your program is based on your age, general health, and types of activity you enjoy. Start slow. But aim for at least 150 minutes of exercise or activity each week. Start with 30 minutes a day. Exercise in 10-minute blocks. Don’t let more than 2 days go by without being active.  Check your blood sugar  Checking your own blood sugar may be a regular part of your care. Or you may only need to check your blood sugar from time to time. Your healthcare provider will tell you how to check your blood sugar at home. Checking it tells you if your blood sugar is in your target range. Having your blood sugars within thetarget range means that you are managing your diabetes well.  If your blood sugar levels are too high or too low, your healthcare provider may suggest changes to your  diet or activity level. He or she may also adjustyour medicine.  Your healthcare provider may also tell you to check your blood sugar more oftenwhen you are sick.  Take care of yourself  When you have diabetes, you may be more likely to get other health problems. They include foot, eye, heart, nerve, and kidney problems. You can help prevent these problems by controlling your blood sugar and taking good care of yourself. Your healthcare provider, nurse, diabetes educator, and others canhelp you with the following:  Checkups. You should have regular checkups with your healthcare provider. At those visits, you will have a physical exam that includes checking your feet. Your healthcare provider will also check your blood pressure and weight. Take your shoes off before your appointment starts to be sure your feet are checked.  Other exams. You'll also need eye, foot, and dental exams at least once each year.  Lab tests. You will have blood and urine tests:  Your healthcare provider will check your hemoglobin A1C at least twice a year. This blood test shows how well you have been controlling your blood sugar over 2 to 3 months. The results help your healthcare provider manage your diabetes.    You will also have other lab tests. For example, to check for kidney problems and abnormal cholesterol levels.  Smoking. If you smoke, you will need to quit. Smoking makes it more likely to get complications from diabetes. Ask your healthcare provider about ways to quit. Also don't use e-cigarette, or vaping, products.  Vaccines. Get a yearly flu shot. And ask your healthcare provider about vaccines to prevent pneumonia, shingles, and hepatitis B.  Stress and depression  Most people have challenges throughout their lives. Living with diabetes can increase your stress. Feeling stressed or depressed can actually affect yourblood sugar levels.  Tell your healthcare provider if you are having trouble coping with diabetes.He or she can  help or refer you to other providers or programs.  To learn more  Know where you can get help. You can try the following:  Support. Ask family and friends to support your efforts to take care of yourself. Or look for a diabetes support group nearby or on the internet. Check the Connect with Others link on www.diabetes.org.  Counseling. Talk with a , psychologist, psychiatrist, or other counselor.  Information. Contact the American Diabetes Association at 568-319-0344 or www.diabetes.org  StayWell last reviewed this educational content on11/1/2019    © 5886-8124 [employer]. All rights reserved. This information is not intended as a substitute for professional medical care. Always follow yourhealthcare professional's instructions.    Type 2 Diabetes and Food Choices  You make food choices every day. Whole wheat or white bread? A side of French fries or fresh fruit? Eat now or later? Choices about what, when, and how much you eat affect your blood sugar (glucose), and also your blood pressure and cholesterol. Understanding how food affects blood glucose is the first step in managing diabetes. And following a diabetesmeal plan can help you keep your blood glucose levels on track.   Prevent problems  Having type 2 diabetes means that your body doesn’t control blood glucose well. When blood glucose stays too high for too long, serious health problems can develop. It's important to control your blood glucose through diet, exercise, and medicine. This can delay or prevent kidney,eye, nerve, and heart disease, and other complications of diabetes.   Control carbohydrates  Carbohydrates are foods that have the biggest effect on your blood glucose levels. After you eat carbohydrates, your blood glucose rises. Fruit, sweet foods and drinks, starchy foods (such as bread, potatoes, and rice), and milk and milk products contain carbohydrates. Carbohydrates are important for health. But when you eat too  many at once, your blood glucose can go too high. This is even more likely if you don't have or take enough insulin forthat food.   Some carbohydrates may raise blood glucose more than others. These include potatoes, sweets, and white bread. Better choices are less processed foods with more fiber and nutrients. Good options are 100% whole-wheat bread, oatmeal,brown rice, and nonstarchy vegetables.   Learn to use food labels that show added sugar. And try to find healthierchoices, particularly if you are overweight.    Food and medicine  Insulin helps glucose move from the blood into your muscle cells, where it can be used for energy. Some diabetes medicines that are taken by mouth help you make more insulin. Or they help your insulin work more efficiently. So your medicines and food plan have to work together. If you take insulin shots, you need to be very careful to match the amount of carbohydrates you eat with your insulin dose. If you have too many carbohydrates without adjusting your insulin dose, your blood glucose might become too high. If you have too few carbohydrates, your blood glucose might be too low. Your healthcare provider ora dietitian can help you match your food choices to your medicine.   Have a meal plan  With certain medicines, it's best to eat the same amount of food at the same time every day. That keeps your glucose levels stable. And it helps your medicine work best. Physical activity is an important way to control blood glucose, too. Try to exercise at the same time every day. That way you can build the extra calories you need for exercise into your meal plan. With othermedicines, you may have more choices about how much you eat and when.   If you want to change your medicine to better fit your lifestyle, talk withyour healthcare provider.   Eat smart  You can eat the same foods as everyone else, but you have to carefully watch for certain details. That’s where your diabetes meal plan  comes in. A personal meal plan tells you the time of day to eat meals and snacks, the types of food to eat, and how much. Itshould include your favorite foods. And it should focus on these healthy foods:   Whole grains, such as 100% whole-wheat bread, brown rice, and oatmeal  Nonfat or low-fat dairy products, such as nonfat milk and yogurt (but be sure these products don't have sugar added to make up for the fat removed)  Lean meats, poultry, fish, eggs, and dried beans and peas  Foods and drinks with no added sugar  Fruits and vegetables  At first, it may be helpful to use measuring cups and spoons to make sure you’re really eating the amount of food that’s in your plan. You can also use standardized portion sizes on food labels, such as 1 serving of meat being the size of a deck of cards. By checking your blood glucose 1 to 2 hours after eating, you can learn how your food choicesaffect your blood glucose.   To create a diabetes meal plan or change a plan that’s not working for you, see a dietitian or diabetes educator. Let them know if you have any new health concerns or if your medicines have changed. Having ameal plan that you can live with will keep you at your healthy best.     © 4002-9996 [employer]. All rights reserved. This information is not intended as a substitute for professional medical care. Always follow yourhealthcare professional's instructions.    Diabetes: Caring for Your Body   When you have diabetes, your body needs special care. This care helps you stay healthy and prevent problems. Exercise and healthy eating are a part of this. You can also protect yourself by taking special care of your feet, skin, teeth,and eyes.   Caring for your feet     Follow these tips to help keep your feet healthy:  Check your feet every day for redness, blisters, cracks, dry skin, or numbness. Use a mirror to check the bottoms of your feet, if needed. Or ask for help.  Wash your feet in warm (not  hot) water. Don’t soak them.  Use an emery board to keep your toenails even with the ends of your toes. File away sharp edges. A foot doctor (podiatrist) may need to cut your toenails for you.  Smooth down calluses gently or wait until your next podiatry appointment.  Keep your skin soft and smooth by putting a thin layer of skin lotion on the tops and bottoms of your feet. Don't put lotion in between your toes.  Always wear shoes or slippers, even inside your home. Make sure that shoes are correctly fitted, not too tight and not too loose. This can cause friction and rubbing of your feet. Change your socks daily. Always check shoes for foreign objects before putting them on.  Call your healthcare provider right away if your feet are numb or painful. Also call your provider if a cut or sore doesn’t heal in a few days.  Preventing skin infections   To prevent skin infections, bathe every day, but use a moderate water temperature.. Dry yourself well, especially between your toes. Try to keep your home on the humid side during the colder months of the year to prevent your skin getting dry. Wash any cuts with warm, soapy water. Cover with a sterile bandage. Call your healthcare provider if a cut or sore doesn't heal in a fewdays, feels warm, itches, is swollen, or has a bad smell.   Caring for your teeth   Follow these guidelines for healthy teeth:  Brush your teeth twice daily.  Floss your teeth daily.  See your dentist at least twice yearly.  Keep your blood sugar in a good range.  Caring for your eyes  Have your eyes checked every year by an optometrist or ophthalmologist. Letyour healthcare provider know if you experience any of the following symptoms:   Blurred vision  Dark spots or \"holes\"  Flashes of light  Seeing more floaters than usual  Poor night vision  If you smoke, quit  Smoking is dangerous for everyone, especially people with diabetes. It can harm the blood vessels in your eyes, kidneys, nerves, and  heart. It raises blood pressure. Smoking can also slow healing, so infections are more likely. Askyour healthcare provider about programs to help you stop smoking.   [employer] last reviewed this educational content on12/1/2021 © 2000-2022 [employer]. All rights reserved. This information is not intended as a substitute for professional medical care. Always follow yourhealthcare professional's instructions.

## 2024-06-24 NOTE — PROGRESS NOTES
The 21st Century Cures Act makes medical notes like these available to patients in the interest of transparency. Please be advised this is a medical document. Medical documents are intended to carry relevant information, facts as evident, and the clinical opinion of the practitioner. The medical note is intended as peer to peer communication and may appear blunt or direct. It is written in medical language and may contain abbreviations or verbiage that are unfamiliar.     Omid Williamson is a 63 year old male who is here for   Chief Complaint   Patient presents with    Well Adult       HPI:     This 63-year-old male presents to the office for his annual wellness exam and follow-up on his medical problems.    The patient has history for type 2 diabetes and is on insulin.  He states that his 7-day blood sugar average is 150.  His 90-day average was 154.  He has been having some hypoglycemic symptoms with sugars under 70 at night.  He states his CGM has awoken him at nighttime and then he will eat something to help with his low blood sugar.  Then he states his blood sugar will be too high.  He is taking Humulin 3 times a day on a sliding scale and has NPH which he takes 30 units in the morning.  He is due for his diabetic eye exam in September.    The patient has history for hypertension, peripheral vascular disease and hypercholesterolemia.  He states he has not been taking his aspirin 81 mg daily for the last 3 to 4 months and has questions about taking aspirin.  He has no past history for peptic ulcer disease or GI bleeding.  He denies any abdominal pain, nausea, vomiting, diarrhea, melena or hematochezia.  He is taking his lisinopril 10 mg daily and his rosuvastatin 5 mg nightly and needs refills.  He denies any chest pain, palpitations, shortness of breath, syncope or leg swelling.  He is not having any symptoms of claudication.    The patient is complaining of discomfort in the bottom of his right foot.  At the  head of the first metatarsal, when he is walking he feels like there is \"a ball\" at the bottom of his foot.  It is sometimes painful.  He occasionally has some paresthesias into the right great toe.  He does not have any other paresthesias, numbness or weakness into either foot.  This has been present for about a year.  He has not noticed any skin breakdown or sores to the feet.  He states he is able to walk without any difficulty but it is becoming more bothersome.    He is also noted some discoloration to his toenails and is wondering if he is developing a fungal infection.    He has not received the most recent COVID booster in 2023.  He has not had a recent Tdap booster and agrees to receive it today.    Exercise: lifting at work 20-25 # at least 50 times a day and walks 15,000 steps at work daily.  Diet: watches somewhat  Sleep: 7-8 hours     Depression/Anxiety:  PHQ-2 SCORE: 0  , done 6/24/2024   Last [place] Suicide Screening on 6/24/2024 was No Risk.       Fall Risk: No falls last 3 months  Gun in home:No             Glasses/contacts:Glasses             Recent eye doctor visit:9/2023   Hearing aids:No    Family History for:  Testicular CA - No   Prostate CA - No                               Colon CA - No  Stomach CA - Mother    Colonoscopy: 6/27/2022 due 2032      Past Medical History:    COVID-19 virus detected    Diabetes mellitus (HCC)    Essential hypertension    Hyperglycemia due to type 2 diabetes mellitus (HCC)    Major depression, single episode    Peripheral vascular disease (HCC)    Pure hypercholesterolemia    Type 1 diabetes mellitus (HCC)    Wears glasses       Past Surgical History:   Procedure Laterality Date    Colonoscopy         Family History   Problem Relation Age of Onset    Cancer Mother         Stomach    Diabetes Paternal Grandmother     Diabetes Sister        Social History     Socioeconomic History    Marital status:    Tobacco Use    Smoking status: Never    Smokeless  tobacco: Never   Vaping Use    Vaping status: Never Used   Substance and Sexual Activity    Alcohol use: Yes     Alcohol/week: 7.0 standard drinks of alcohol     Types: 7 Shots of liquor per week     Comment: i shot daily    Drug use: Never    Sexual activity: Yes     Works as a  making bubble wrap.    Current Outpatient Medications on File Prior to Visit   Medication Sig Dispense Refill    Continuous Glucose Sensor (FREESTYLE ARETHA 3 SENSOR) Does not apply Misc 1 Device every 14 (fourteen) days. 1 each 3    ROSUVASTATIN 5 MG Oral Tab TAKE 1 TABLET NIGHTLY 90 tablet 3    LISINOPRIL 10 MG Oral Tab TAKE 1 TABLET DAILY 90 tablet 3    Continuous Blood Gluc Sensor (FREESTYLE ARETHA 2 SENSOR) Does not apply Misc 1 Device every 14 (fourteen) days. 6 each 3    Elastic Bandages & Supports (TENNIS ELBOW STRAP) Does not apply Misc Apply to the right arm during the day and take it off for sleeping. For tennis elbow. 1 each 0    Continuous Blood Gluc Sensor (FREESTYLE ARETHA 2 SENSOR) Does not apply Misc 1 each every 14 (fourteen) days. 2 each 11    GLUCOS-CHONDROIT-COLLAG-HYAL OR Take by mouth.      omega-3 Oral Liquid Take by mouth daily.      multiple vitamin Oral Chew Tab Chew 1 tablet by mouth daily.      Continuous Blood Gluc  (FREESTYLE ARETHA 2 READER) Does not apply Device 1 Device daily. 1 each 0    insulin regular human (HUMULIN R) 100 UNIT/ML Injection Solution INJECT INTO THE SKIN THREE TIMES A DAY ACCORDING TO YOUR SLIDING SCALE 30 mL 0    aspirin 81 MG Oral Chew Tab Chew 1 tablet (81 mg total) by mouth daily. (Patient not taking: Reported on 6/24/2024)      Insulin NPH, Human,, Isophane, (NOVOLIN N) 100 UNIT/ML Subcutaneous Suspension Inject 30 Units into the skin daily with breakfast.       No current facility-administered medications on file prior to visit.       No Known Allergies    REVIEW OF SYSTEMS:     See HPI for relevant ROS  GENERAL HEALTH: no other complaints  NEURO: no other  complaints  VISION: no other complaints  RESPIRATORY: no other complaints  CARDIOVASCULAR: no other complaints  GI: no other complaints  : no other complaints  SKIN: no other complaints  PSYCH: no other complaints  EXT: no other complaints    EXAM:   /66 (BP Location: Left arm, Patient Position: Sitting, Cuff Size: adult)   Pulse 75   Temp 98 °F (36.7 °C)   Resp 16   Ht 5' 8\" (1.727 m)   Wt 168 lb (76.2 kg)   SpO2 99%   BMI 25.54 kg/m²     Wt Readings from Last 3 Encounters:   06/24/24 168 lb (76.2 kg)   10/24/23 169 lb (76.7 kg)   04/24/23 171 lb (77.6 kg)     BP Readings from Last 3 Encounters:   06/24/24 106/66   10/24/23 128/68   04/24/23 112/70        Visual Acuity  Right Eye Visual Acuity: Corrected Right Eye Chart Acuity: 20/20   Left Eye Visual Acuity: Corrected Left Eye Chart Acuity: 20/20   Both Eyes Visual Acuity: Corrected Both Eyes Chart Acuity: 20/20   Able To Tolerate Visual Acuity: Yes      Weight is down 3 # from 4/24/2023 OV.    General: WH/WN/WD, in NAD, A and O times 3  HEAD: Normocephalic, atraumatic  EYES: JANEEN, EOMI, conjunctiva normal, sclera anicteric  EARS: Tympanic membranes normal, EAC's normal.  NOSE: Turbninates normal, no bleeding noted.  PHARYNX:  No eythema or exudates, mucous membrane moist, airway patent.  NECK:  No cervical lymphadenopathy or thyromegaly, normal ROM, no bruits noted.  HEART: Regular rate and rhythm, no S3, S4 or murmur noted.  LUNGS: Clear to ausculation. No retractions or tachypnea noted.  ABDOMEN: Soft, nontender, no guarding, rigidity or rebound, no masses or hepatosplenomegaly, normal bowel sounds in all four quadrants.  : Deferred  BACK: No tenderness over the thoracic or lumbar spine. No scoliosis noted.  EXTREMITIES: No clubbing, cyanosis, edema noted. Motor strength +5/5 in all 4 extremities. DTR's +2/4 in all 4 extremities.  Bilateral barefoot skin diabetic exam is normal, visualized feet and the appearance is normal.  Bilateral  monofilament/sensation of both feet is normal.  Pulsation pedal pulse exam of both lower legs/feet is normal as well.  Patient complains of discomfort over the first MT head plantar surface of the right foot. No callus formation is noted. No signs of infection are noted.  SKIN: Warm and dry.  NEURO: Cr. N. II-XII intact, normal gait  PSYCH: Mood and affect are normal.      The 10-year ASCVD risk score (Nima BERNSTEIN, et al., 2019) is: 14.2%    Values used to calculate the score:      Age: 63 years      Sex: Male      Is Non- : No      Diabetic: Yes      Tobacco smoker: No      Systolic Blood Pressure: 106 mmHg      Is BP treated: Yes      HDL Cholesterol: 66 mg/dL      Total Cholesterol: 175 mg/dL    ASSESSMENT AND PLAN:     1. Wellness examination  -We discussed the following:  Healthy diet and exercise, immunizations, cancer screening.  I reminded the patient to get his flu shot and the newest COVID booster in the fall.    2. Laboratory exam ordered as part of routine general medical examination    - CBC With Differential With Platelet  - Comp Metabolic Panel (14)  - Lipid Panel  - TSH W Reflex To Free T4    3. Screening for prostate cancer    - PSA - Total [5363][Q]    4. Type 2 diabetes mellitus with hyperglycemia, with long-term current use of insulin (HCC)      Diabetes:    Lab Results   Component Value Date    A1C 7.1 (A) 10/24/2023    A1C 7.2 (H) 05/13/2023    A1C 7.7 (H) 10/17/2022     (H) 10/17/2022     (H) 04/30/2022     Lab Results   Component Value Date    CHOLEST 175 05/13/2023    CHOLEST 154 04/30/2022     Lab Results   Component Value Date    HDL 66 05/13/2023    HDL 57 04/30/2022     Lab Results   Component Value Date    LDL 93 05/13/2023    LDL 80 04/30/2022     Lab Results   Component Value Date    TRIG 69 05/13/2023    TRIG 91 04/30/2022     Lab Results   Component Value Date    AST 19 05/13/2023    AST 23 10/17/2022    AST 30 07/18/2022     Lab Results    Component Value Date    ALT 20 05/13/2023    ALT 30 10/17/2022    ALT 29 07/18/2022     The patient is due for his laboratory.  His diabetes has been pretty well-controlled with his last A1c of 7.1.  He is having some hypoglycemic symptoms and we discussed management.  Diabetic foot care is reviewed.  He is due for his diabetic eye exam in September 2024.  I reminded the patient I need a copy of his doctor's report.    - HGB A1C [496] [Q]  - MICROALB/CREAT RATIO, RANDOM URINE [6517] [Q]    5. Essential hypertension    Patient's blood pressure is well-controlled on his lisinopril which I refilled today.  I answered the patient's questions regarding the use of low-dose aspirin.  I explained how this was a had antiplatelet properties to help lessen likelihood for coronary artery disease.  I did explain the potential for GI bleeding.  After discussion, the patient stated he would restart his aspirin 81 mg daily.    6. Pure hypercholesterolemia    The patient's lipids are at goal.  I refilled his rosuvastatin today.    7. Peripheral vascular disease (HCC)    Patient is currently without any symptoms of claudication.  His lipids are at goal.     8. Right foot pain    The patient is referred to Dr. Haro of podiatry for further evaluation of metatarsalgia.    9. Onychomycosis    Patient would be interested in antifungal therapy.  I told the patient I would need to see the results of his labs before I would start Lamisil.  The patient was reminded he would need to take this once daily for 3 months and he should not drink alcohol while on the medication.    10.  Need for Tdap vaccine    The patient was given a Tdap booster today.  Side effects are reviewed.    Health maintenance:    Health Maintenance   Topic Date Due    DTaP,Tdap,and Td Vaccines (1 - Tdap) 06/24/2034    COVID-19 Vaccine (5 - 2023-24 season) 10/01/2024    Annual Depression Screening  Completed    Diabetes Care A1C  12/24/2024    PSA  06/24/2026    Diabetes  Care: GFR  06/24/2025    Diabetes Care: Microalb/Creat Ratio  06/24/2025    Diabetes Care Dilated Eye Exam  09/28/2024    Diabetes Care Foot Exam  06/24/2025    Annual Physical  06/24/2025    Colorectal Cancer Screening  06/27/2032    Influenza Vaccine  Completed    Pneumococcal Vaccine: Birth to 64yrs  Completed    Zoster Vaccines  Completed         Orders This Visit:  Orders Placed This Encounter   Procedures    CBC With Differential With Platelet    Comp Metabolic Panel (14)    Lipid Panel    TSH W Reflex To Free T4    HGB A1C [496] [Q]    MICROALB/CREAT RATIO, RANDOM URINE [6517] [Q]    PSA - Total [5363][Q]       Meds This Visit:  Requested Prescriptions      No prescriptions requested or ordered in this encounter       Imaging & Referrals:  None     The patient indicates understanding of these issues and agrees to the plan.  The patient is asked to return in 6 months for recheck on DM, HTN.  Magaly Dangelo,     I spent a total of 55 minutes including precharting, H&P, plan of care    This dictation was performed with a verbal recognition program (DRAGON) and it was checked for errors. It is possible that there are still dictated errors within this office note. If so, please bring any errors to my attention for an addendum. All efforts were made to ensure that this office note is accurate

## 2024-06-30 LAB
ABSOLUTE BASOPHILS: 22 CELLS/UL (ref 0–200)
ABSOLUTE EOSINOPHILS: 123 CELLS/UL (ref 15–500)
ABSOLUTE LYMPHOCYTES: 1473 CELLS/UL (ref 850–3900)
ABSOLUTE MONOCYTES: 364 CELLS/UL (ref 200–950)
ABSOLUTE NEUTROPHILS: 3618 CELLS/UL (ref 1500–7800)
ALBUMIN/GLOBULIN RATIO: 2 (CALC) (ref 1–2.5)
ALBUMIN: 4.5 G/DL (ref 3.6–5.1)
ALKALINE PHOSPHATASE: 76 U/L (ref 35–144)
ALT: 19 U/L (ref 9–46)
AST: 21 U/L (ref 10–35)
BASOPHILS: 0.4 %
BILIRUBIN, TOTAL: 0.7 MG/DL (ref 0.2–1.2)
BUN/CREATININE RATIO: 31 (CALC) (ref 6–22)
BUN: 31 MG/DL (ref 7–25)
CALCIUM: 9.3 MG/DL (ref 8.6–10.3)
CARBON DIOXIDE: 29 MMOL/L (ref 20–32)
CHLORIDE: 104 MMOL/L (ref 98–110)
CHOL/HDLC RATIO: 2.5 (CALC)
CHOLESTEROL, TOTAL: 168 MG/DL
CREATININE, RANDOM URINE: 141 MG/DL (ref 20–320)
CREATININE: 1 MG/DL (ref 0.7–1.35)
EGFR: 84 ML/MIN/1.73M2
EOSINOPHILS: 2.2 %
GLOBULIN: 2.2 G/DL (CALC) (ref 1.9–3.7)
GLUCOSE: 164 MG/DL (ref 65–99)
HDL CHOLESTEROL: 67 MG/DL
HEMATOCRIT: 47.1 % (ref 38.5–50)
HEMOGLOBIN A1C: 7.5 % OF TOTAL HGB
HEMOGLOBIN: 15.4 G/DL (ref 13.2–17.1)
LDL-CHOLESTEROL: 86 MG/DL (CALC)
LYMPHOCYTES: 26.3 %
MCH: 29.5 PG (ref 27–33)
MCHC: 32.7 G/DL (ref 32–36)
MCV: 90.2 FL (ref 80–100)
MICROALBUMIN/CREATININE RATIO, RANDOM URINE: 4 MG/G CREAT
MICROALBUMIN: 0.5 MG/DL
MONOCYTES: 6.5 %
MPV: 10.8 FL (ref 7.5–12.5)
NEUTROPHILS: 64.6 %
NON-HDL CHOLESTEROL: 101 MG/DL (CALC)
PLATELET COUNT: 234 THOUSAND/UL (ref 140–400)
POTASSIUM: 5 MMOL/L (ref 3.5–5.3)
PROTEIN, TOTAL: 6.7 G/DL (ref 6.1–8.1)
PSA, TOTAL: 0.6 NG/ML
RDW: 13.4 % (ref 11–15)
RED BLOOD CELL COUNT: 5.22 MILLION/UL (ref 4.2–5.8)
SODIUM: 139 MMOL/L (ref 135–146)
TRIGLYCERIDES: 67 MG/DL
TSH W/REFLEX TO FT4: 1.25 MIU/L (ref 0.4–4.5)
WHITE BLOOD CELL COUNT: 5.6 THOUSAND/UL (ref 3.8–10.8)

## 2024-07-15 DIAGNOSIS — Z79.4 TYPE 2 DIABETES MELLITUS WITH HYPERGLYCEMIA, WITH LONG-TERM CURRENT USE OF INSULIN (HCC): ICD-10-CM

## 2024-07-15 DIAGNOSIS — E11.65 TYPE 2 DIABETES MELLITUS WITH HYPERGLYCEMIA, WITH LONG-TERM CURRENT USE OF INSULIN (HCC): ICD-10-CM

## 2024-07-16 RX ORDER — BLOOD-GLUCOSE SENSOR
EACH MISCELLANEOUS
Qty: 3 EACH | Refills: 1 | Status: SHIPPED | OUTPATIENT
Start: 2024-07-16

## 2024-08-21 ENCOUNTER — OFFICE VISIT (OUTPATIENT)
Dept: PODIATRY CLINIC | Facility: CLINIC | Age: 64
End: 2024-08-21
Payer: COMMERCIAL

## 2024-08-21 DIAGNOSIS — R20.2 PARESTHESIA: ICD-10-CM

## 2024-08-21 DIAGNOSIS — M20.5X1 HALLUX LIMITUS, RIGHT: Primary | ICD-10-CM

## 2024-08-21 DIAGNOSIS — M77.51 CAPSULITIS OF TOE, RIGHT: ICD-10-CM

## 2024-08-21 PROCEDURE — 20600 DRAIN/INJ JOINT/BURSA W/O US: CPT | Performed by: STUDENT IN AN ORGANIZED HEALTH CARE EDUCATION/TRAINING PROGRAM

## 2024-08-21 PROCEDURE — 99203 OFFICE O/P NEW LOW 30 MIN: CPT | Performed by: STUDENT IN AN ORGANIZED HEALTH CARE EDUCATION/TRAINING PROGRAM

## 2024-08-22 VITALS — DIASTOLIC BLOOD PRESSURE: 82 MMHG | SYSTOLIC BLOOD PRESSURE: 134 MMHG

## 2024-08-22 NOTE — PROGRESS NOTES
Per Dr. Haro to draw up .5ml of dexamethasone sodium phosphate. .5ml Kenalog. 10 and 1ml marcaine 0.5% for a right foot injection.

## 2024-08-23 RX ORDER — DEXAMETHASONE SODIUM PHOSPHATE 4 MG/ML
2 VIAL (ML) INJECTION ONCE
Status: COMPLETED | OUTPATIENT
Start: 2024-08-23 | End: 2024-08-23

## 2024-08-25 NOTE — PROGRESS NOTES
[facility]  Progress Note    Omid Williamson is a 63 year old male.   Chief Complaint   Patient presents with    Consult     R foot- R hallux pain when bending toe. Pain radiates up the foot. Pt states numbness in ball of foot. Pt is diabetic. FBS- 119         HPI:     Patient is a pleasant 63-year-old male with past medical history of diabetes who presents to clinic for evaluation of right foot pain.  He has pain in his right hallux and pain in his toe.  The pain does sometimes radiate to his foot.  He does experience numbness to the ball of his foot under his big toe.  He denies any recent trauma or injury.  He does have to ambulate with steel toed boots at work.  His blood sugars were 119 mg/dL when last checked.  No other complaints are mentioned.      Allergies: Patient has no known allergies.   Current Outpatient Medications   Medication Sig Dispense Refill    Continuous Glucose Sensor (FREESTYLE ARETHA 3 SENSOR) Does not apply Misc **$225 FOR 3 MONTHS**CHANGE EVERY 14 (FOURTEEN) DAYS. 3 each 1    rosuvastatin 5 MG Oral Tab Take 1 tablet (5 mg total) by mouth nightly. 90 tablet 3    lisinopril 10 MG Oral Tab Take 1 tablet (10 mg total) by mouth daily. 90 tablet 3    insulin regular human (HUMULIN R) 100 UNIT/ML Injection Solution INJECT INTO THE SKIN THREE TIMES A DAY ACCORDING TO YOUR SLIDING SCALE 30 mL 0    Continuous Blood Gluc Sensor (FREESTYLE ARETHA 2 SENSOR) Does not apply Misc 1 each every 14 (fourteen) days. 2 each 11    aspirin 81 MG Oral Chew Tab Chew 1 tablet (81 mg total) by mouth daily.      GLUCOS-CHONDROIT-COLLAG-HYAL OR Take by mouth.      omega-3 Oral Liquid Take by mouth daily.      multiple vitamin Oral Chew Tab Chew 1 tablet by mouth daily.      Insulin NPH, Human,, Isophane, (NOVOLIN N) 100 UNIT/ML Subcutaneous Suspension Inject 30 Units into the skin daily with breakfast.      Continuous Blood Gluc Sensor (FREESTYLE ARETHA 2 SENSOR) Does not apply Misc 1 Device every 14  (fourteen) days. 6 each 3    Elastic Bandages & Supports (TENNIS ELBOW STRAP) Does not apply Misc Apply to the right arm during the day and take it off for sleeping. For tennis elbow. 1 each 0    Continuous Blood Gluc  (FREESTYLE ARETHA 2 READER) Does not apply Device 1 Device daily. 1 each 0      Past Medical History:    COVID-19 virus detected    Diabetes mellitus (HCC)    Essential hypertension    Hyperglycemia due to type 2 diabetes mellitus (HCC)    Major depression, single episode    Peripheral vascular disease (HCC)    Pure hypercholesterolemia    Type 1 diabetes mellitus (HCC)    Wears glasses      Past Surgical History:   Procedure Laterality Date    Colonoscopy        Family History   Problem Relation Age of Onset    Cancer Mother         Stomach    Diabetes Paternal Grandmother     Diabetes Sister       Social History     Socioeconomic History    Marital status:    Tobacco Use    Smoking status: Never    Smokeless tobacco: Never   Vaping Use    Vaping status: Never Used   Substance and Sexual Activity    Alcohol use: Yes     Alcohol/week: 7.0 standard drinks of alcohol     Types: 7 Shots of liquor per week     Comment: i shot daily    Drug use: Never    Sexual activity: Yes           REVIEW OF SYSTEMS:     Denies nausea, vomiting, fever, chills.      EXAM:   /82 (BP Location: Right arm, Patient Position: Sitting, Cuff Size: adult)   GENERAL: well developed, well nourished, in no apparent distress  EXTREMITIES:   1. Integument: Normal skin temperature and turgor  2. Vascular: Dorsalis pedis two out of four bilateral and posterior tibial pulses two out of   four bilateral, capillary refill normal.   3. Musculoskeletal: All muscle groups are graded 5 out of 5 in the foot and ankle.  Minor bunion deformity to right foot.  No pain with first MPJ to motion.  Minor pain noted to plantar sesamoid/capsule of first MPJ of right foot.   4. Neurological: Normal sharp dull sensation; reflexes  normal.          ASSESSMENT AND PLAN:   Diagnoses and all orders for this visit:    Hallux limitus, right  -     [facility] AMB POD XR - RT FOOT 3 VIEWS(AP,LAT,OBLIQUE)WT BEARING  -     dexamethasone (Decadron) 4 MG/ML injection 2 mg  -     triamcinolone acetonide (Kenalog-10) 10 mg/mL injection    Capsulitis of toe, right    Paresthesia        Plan:    -Patient examined, chart history reviewed.  -Discussed etiology of condition and various treatment options.  -Patient does appear to have some pain associated with hallux limitus and/or capsulitis of his right great toe that may be irritating nerve in region.  -X-rays obtained and reviewed.  Mild bunion/arthritis to great toe joint but no acute fractures or osseous abnormalities noted.  -Discussed cortisone injection with patient including benefits and risks.  Patient agreeable and written consent was obtained.  After prepping site with alcohol, administer cortisone injection of right great toe joint/medial capsule consisting of 1 cc 0.5% Marcaine plain, 0.5 cc of Kenalog 10, 0.5 cc of dexamethasone.  Patient tolerated injection well and there are no complications.  Site was dressed with Band-Aid.  -Patient should ambulate with supportive shoes and avoid walking barefoot.  Informational handout dispensed with recommended shoes and inserts.  -RTC 1 month for reevaluation.  Could consider MRI if symptoms worsen or fail to improve.    The patient indicates understanding of these issues and agrees to the plan.        Tj Haro DPM    Dragon speech recognition software was used to prepare this note.  Errors in word recognition may occur.  Please contact me with any questions/concerns with this note.

## 2024-09-25 ENCOUNTER — OFFICE VISIT (OUTPATIENT)
Dept: PODIATRY CLINIC | Facility: CLINIC | Age: 64
End: 2024-09-25
Payer: COMMERCIAL

## 2024-09-25 DIAGNOSIS — M79.2 NEURITIS: ICD-10-CM

## 2024-09-25 DIAGNOSIS — M54.16 LUMBAR RADICULOPATHY: ICD-10-CM

## 2024-09-25 DIAGNOSIS — R20.2 PARESTHESIA: ICD-10-CM

## 2024-09-25 DIAGNOSIS — M20.5X1 HALLUX LIMITUS, RIGHT: Primary | ICD-10-CM

## 2024-09-25 DIAGNOSIS — M77.51 CAPSULITIS OF TOE, RIGHT: ICD-10-CM

## 2024-09-25 PROCEDURE — 99213 OFFICE O/P EST LOW 20 MIN: CPT | Performed by: STUDENT IN AN ORGANIZED HEALTH CARE EDUCATION/TRAINING PROGRAM

## 2024-09-25 NOTE — PROGRESS NOTES
[facility] Podiatry  Progress Note    Omid Williamson is a 63 year old male.   Chief Complaint   Patient presents with    Toe Pain     R hallux f/u- had injection on 8/21/2024 w/ no improvement- rates pain 3-4/10 w/ WB- per pt the pain is not the concern but the numbness- FBS this ua=[id number]         HPI:     Patient is a pleasant 63-year-old male with past medical history of diabetes who presents to clinic for evaluation of right foot numbness.  He has numbness to his great toe and sesamoid region.  He had injection at last visit that did not improve his symptoms. He has pain in his right hallux and pain in his toe.  The pain does sometimes radiate to his foot.  He does experience numbness to the ball of his foot under his big toe.  He denies any recent trauma or injury.  He does have to ambulate with steel toed boots at work.  His blood sugars were 122 mg/dL when last checked.  No other complaints are mentioned.      Allergies: Patient has no known allergies.   Current Outpatient Medications   Medication Sig Dispense Refill    Continuous Glucose Sensor (FREESTYLE ARETHA 3 SENSOR) Does not apply Misc **$225 FOR 3 MONTHS**CHANGE EVERY 14 (FOURTEEN) DAYS. 3 each 1    rosuvastatin 5 MG Oral Tab Take 1 tablet (5 mg total) by mouth nightly. 90 tablet 3    lisinopril 10 MG Oral Tab Take 1 tablet (10 mg total) by mouth daily. 90 tablet 3    insulin regular human (HUMULIN R) 100 UNIT/ML Injection Solution INJECT INTO THE SKIN THREE TIMES A DAY ACCORDING TO YOUR SLIDING SCALE 30 mL 0    Continuous Blood Gluc Sensor (FREESTYLE ARETHA 2 SENSOR) Does not apply Misc 1 Device every 14 (fourteen) days. 6 each 3    Elastic Bandages & Supports (TENNIS ELBOW STRAP) Does not apply Misc Apply to the right arm during the day and take it off for sleeping. For tennis elbow. 1 each 0    Continuous Blood Gluc Sensor (FREESTYLE ARETHA 2 SENSOR) Does not apply Misc 1 each every 14 (fourteen) days. 2 each 11    aspirin 81 MG Oral Chew Tab Chew 1  tablet (81 mg total) by mouth daily.      GLUCOS-CHONDROIT-COLLAG-HYAL OR Take by mouth.      omega-3 Oral Liquid Take by mouth daily.      multiple vitamin Oral Chew Tab Chew 1 tablet by mouth daily.      Insulin NPH, Human,, Isophane, (NOVOLIN N) 100 UNIT/ML Subcutaneous Suspension Inject 30 Units into the skin daily with breakfast.      Continuous Blood Gluc  (FREESTYLE ARETHA 2 READER) Does not apply Device 1 Device daily. 1 each 0      Past Medical History:    COVID-19 virus detected    Diabetes mellitus (HCC)    Essential hypertension    Hyperglycemia due to type 2 diabetes mellitus (HCC)    Major depression, single episode    Peripheral vascular disease (HCC)    Pure hypercholesterolemia    Type 1 diabetes mellitus (HCC)    Wears glasses      Past Surgical History:   Procedure Laterality Date    Colonoscopy        Family History   Problem Relation Age of Onset    Cancer Mother         Stomach    Diabetes Paternal Grandmother     Diabetes Sister       Social History     Socioeconomic History    Marital status:    Tobacco Use    Smoking status: Never    Smokeless tobacco: Never   Vaping Use    Vaping status: Never Used   Substance and Sexual Activity    Alcohol use: Yes     Alcohol/week: 7.0 standard drinks of alcohol     Types: 7 Shots of liquor per week     Comment: i shot daily    Drug use: Never    Sexual activity: Yes           REVIEW OF SYSTEMS:     Denies nausea, vomiting, fever, chills.      EXAM:   There were no vitals taken for this visit.  GENERAL: well developed, well nourished, in no apparent distress  EXTREMITIES:   1. Integument: Normal skin temperature and turgor  2. Vascular: Dorsalis pedis two out of four bilateral and posterior tibial pulses two out of   four bilateral, capillary refill normal.   3. Musculoskeletal: All muscle groups are graded 5 out of 5 in the foot and ankle.  Minor bunion deformity to right foot.  No pain with first MPJ to motion.  Minor pain noted to plantar  sesamoid/capsule of first MPJ of right foot.   4. Neurological: Normal sharp dull sensation; reflexes normal.          ASSESSMENT AND PLAN:   Diagnoses and all orders for this visit:    Hallux limitus, right  -     MRI FOOT, RIGHT (CPT=73718); Future    Capsulitis of toe, right  -     MRI FOOT, RIGHT (CPT=73718); Future    Paresthesia  -     EMG (At [facility]); Future  -     MRI FOOT, RIGHT (CPT=73718); Future    Neuritis  -     EMG (At [facility]); Future  -     MRI FOOT, RIGHT (CPT=73718); Future    Lumbar radiculopathy  -     EMG (At [facility]); Future          Plan:    -Patient examined, chart history reviewed.  -Discussed etiology of condition and various treatment options.  -Patient does appear to have some pain associated with hallux limitus and/or capsulitis of his right great toe that may be irritating nerve in region.  -X-rays  reviewed.  Mild bunion/arthritis to great toe joint but no acute fractures or osseous abnormalities noted.  -Patient did not improve with last cortisone injection.  -Will order MRI for further evaluation.  Will also order EMG as some of his symptoms appear neurological in nature.  -Will follow-up once further testing is completed.  All questions answered to satisfaction.  -In meantime, she try to ambulate with supportive shoes with wide toe box.    The patient indicates understanding of these issues and agrees to the plan.        RAMY Harper speech recognition software was used to prepare this note.  Errors in word recognition may occur.  Please contact me with any questions/concerns with this note.

## 2024-10-18 ENCOUNTER — TELEPHONE (OUTPATIENT)
Dept: PODIATRY CLINIC | Facility: CLINIC | Age: 64
End: 2024-10-18

## 2024-10-18 NOTE — TELEPHONE ENCOUNTER
Patient calling stating [facility] received MRI order but is also requesting for last visit notes 9/25/2024    Fax number [phone]

## 2024-10-18 NOTE — TELEPHONE ENCOUNTER
Marcelina from [facility] requesting for MRI for the right foot without contrast order  to be fax today because patient has the appointment for tomorrow.    Please to fax it to:  [facility]   Fax number [phone]

## 2024-11-19 ENCOUNTER — TELEPHONE (OUTPATIENT)
Dept: PODIATRY CLINIC | Facility: CLINIC | Age: 64
End: 2024-11-19

## 2024-11-19 NOTE — TELEPHONE ENCOUNTER
MRI report in the imaging tab from 10/25/24- Scan of report is present there.    Please see results and advise

## 2024-11-25 NOTE — TELEPHONE ENCOUNTER
Voicemail left for patient with MRI results. Should complete EMG as scheduled. Happy to call again and further discuss with any questions or concerns.

## 2024-12-28 DIAGNOSIS — Z79.4 TYPE 2 DIABETES MELLITUS WITH HYPERGLYCEMIA, WITH LONG-TERM CURRENT USE OF INSULIN (HCC): ICD-10-CM

## 2024-12-28 DIAGNOSIS — E11.65 TYPE 2 DIABETES MELLITUS WITH HYPERGLYCEMIA, WITH LONG-TERM CURRENT USE OF INSULIN (HCC): ICD-10-CM

## 2024-12-30 RX ORDER — ACYCLOVIR 800 MG/1
TABLET ORAL
Qty: 3 EACH | Refills: 0 | Status: SHIPPED | OUTPATIENT
Start: 2024-12-30

## 2025-02-11 ENCOUNTER — OFFICE VISIT (OUTPATIENT)
Dept: ELECTROPHYSIOLOGY | Facility: HOSPITAL | Age: 65
End: 2025-02-11
Attending: STUDENT IN AN ORGANIZED HEALTH CARE EDUCATION/TRAINING PROGRAM
Payer: COMMERCIAL

## 2025-02-11 DIAGNOSIS — R20.0 NUMBNESS OF RIGHT FOOT: Primary | ICD-10-CM

## 2025-02-11 PROCEDURE — 95886 MUSC TEST DONE W/N TEST COMP: CPT | Performed by: OTHER

## 2025-02-11 PROCEDURE — 95910 NRV CNDJ TEST 7-8 STUDIES: CPT | Performed by: OTHER

## 2025-02-11 NOTE — PROCEDURES
[facility]  Nerve Conduction & EMG Report                      Ginny Ruiz D.O.  [facility]   EMG department   [address] [place] [id number]  [phone]          Patient name: Omid Williamson  YOB: 1960  Referring provider: Dr. Haro  Reason for study: Eval for mononeuropathy or radiculopathy  Brief history: 64 year old male with several month history of numbness on the bottom of the right toe and right medial ball of the foot.  On further questioning, patient reports the sensation is more of a pressure in the ball of his foot.  On sensory exam, he has normal sensation in the right foot.      Sensory and motor nerve conduction studies, and needle EMG:  Please see separate sheet for waveforms and quantitative nerve conduction data, as well as for tabulated form of electromyographic data.      Summary:   1.  The bilateral sural sensory responses are normal.  2.  The bilateral medial plantar sensory responses are normal.  3.  The bilateral tibial motor responses are normal.  4.  The right peroneal motor response is normal.  The left peroneal motor response had decreased amplitudes.  5.  Needle EMG using a concentric needle was performed on selected muscles of the right lower extremity.  Right lumbar paraspinal muscles were also sampled.  All muscles tested were normal.    Conclusion:  There is no electrophysiologic evidence of a mononeuropathy or lumbar radiculopathy affecting the right foot.      Ginny Ruiz DO  Neurology and Neuromuscular medicine  [facility]

## 2025-02-20 ENCOUNTER — TELEPHONE (OUTPATIENT)
Dept: FAMILY MEDICINE CLINIC | Facility: CLINIC | Age: 65
End: 2025-02-20

## 2025-02-20 NOTE — TELEPHONE ENCOUNTER
Patient requesting new order for sensors due to  discontinuing previous model. Order pended for review

## 2025-02-20 NOTE — TELEPHONE ENCOUNTER
The patient is calling to request a new prescription for the FREESTYLE ARETHA 3 SENSOR PLUS, as the  will no longer be making the FREESTYLE ARETHA 3 SENSOR.    Please send the new prescription for the FREESTYLE ARETHA 3 SENSOR PLUS to [facility] [employer], as listed in the patient's file.    Patient made an appointment as well for 3/31/25 for Dr. Magaly Dangelo.    Thank you.

## 2025-02-21 RX ORDER — HYDROCHLOROTHIAZIDE 12.5 MG/1
1 CAPSULE ORAL
Qty: 4 EACH | Refills: 3 | Status: SHIPPED | OUTPATIENT
Start: 2025-02-21

## 2025-03-04 ENCOUNTER — TELEPHONE (OUTPATIENT)
Dept: PODIATRY CLINIC | Facility: CLINIC | Age: 65
End: 2025-03-04

## 2025-03-04 NOTE — TELEPHONE ENCOUNTER
Pt called.  Had emg on 2-11-25. Please call pt with results or should pt schedule an appointment.

## 2025-03-08 NOTE — TELEPHONE ENCOUNTER
Discussed results with patient on telephone.  Can we please offer appointment sometime on Monday or Thursday AM for possible cortisone injection.

## 2025-03-10 ENCOUNTER — TELEPHONE (OUTPATIENT)
Dept: PODIATRY CLINIC | Facility: CLINIC | Age: 65
End: 2025-03-10

## 2025-03-10 NOTE — TELEPHONE ENCOUNTER
Patient returning a missed call  from today to schedule an appointment for results.Please advise

## 2025-03-10 NOTE — TELEPHONE ENCOUNTER
Left message to call back.       Jennifer Xiong routed conversation to [facility] Clinical Staff20 minutes ago (4:05 PM)     Jennifer Xiong21 minutes ago (4:05 PM)     ZT  Patient returning a missed call  from today to schedule an appointment for results.Please advise

## 2025-03-17 ENCOUNTER — OFFICE VISIT (OUTPATIENT)
Dept: PODIATRY CLINIC | Facility: CLINIC | Age: 65
End: 2025-03-17
Payer: COMMERCIAL

## 2025-03-17 VITALS — SYSTOLIC BLOOD PRESSURE: 132 MMHG | DIASTOLIC BLOOD PRESSURE: 64 MMHG

## 2025-03-17 DIAGNOSIS — M79.2 NEURITIS: ICD-10-CM

## 2025-03-17 DIAGNOSIS — M77.51 CAPSULITIS OF TOE, RIGHT: ICD-10-CM

## 2025-03-17 DIAGNOSIS — R20.2 PARESTHESIA: ICD-10-CM

## 2025-03-17 DIAGNOSIS — M54.16 LUMBAR RADICULOPATHY: ICD-10-CM

## 2025-03-17 DIAGNOSIS — E10.9 DIABETES MELLITUS TYPE 1, CONTROLLED, WITHOUT COMPLICATIONS (HCC): ICD-10-CM

## 2025-03-17 DIAGNOSIS — M20.5X1 HALLUX LIMITUS, RIGHT: Primary | ICD-10-CM

## 2025-03-17 RX ORDER — TRIAMCINOLONE ACETONIDE 40 MG/ML
20 INJECTION, SUSPENSION INTRA-ARTICULAR; INTRAMUSCULAR ONCE
Status: COMPLETED | OUTPATIENT
Start: 2025-03-17 | End: 2025-03-17

## 2025-03-17 NOTE — PROGRESS NOTES
[facility] Podiatry  Progress Note    Omid Williamson is a 64 year old male.   Chief Complaint   Patient presents with    Injection     Numbness to right hallux- pain off and on         HPI:     Patient is a pleasant 64-year-old male with past medical history of diabetes who presents to clinic for evaluation of right foot pain/numb sensation to his first MPJ region.  He has pain and numbness in his right hallux and pain in his toe.  He has completed EMG which was normal.  He has also completed MRI which showed some arthritic changes to his great toe joint but no other acute concerns.  He is interested in trying injection to site today.  No other complaints are mentioned.    Allergies: Patient has no known allergies.   Current Outpatient Medications   Medication Sig Dispense Refill    Continuous Glucose Sensor (FREESTYLE ARETHA 3 PLUS SENSOR) Does not apply Misc 1 each every 14 (fourteen) days. 4 each 3    Continuous Glucose Sensor (FREESTYLE ARETHA 3 SENSOR) Does not apply Misc APPLY AS DIRECTED EVERY 14 DAYS 3 each 0    rosuvastatin 5 MG Oral Tab Take 1 tablet (5 mg total) by mouth nightly. 90 tablet 3    lisinopril 10 MG Oral Tab Take 1 tablet (10 mg total) by mouth daily. 90 tablet 3    insulin regular human (HUMULIN R) 100 UNIT/ML Injection Solution INJECT INTO THE SKIN THREE TIMES A DAY ACCORDING TO YOUR SLIDING SCALE 30 mL 0    Continuous Blood Gluc Sensor (FREESTYLE ARETHA 2 SENSOR) Does not apply Misc 1 each every 14 (fourteen) days. 2 each 11    aspirin 81 MG Oral Chew Tab Chew 1 tablet (81 mg total) by mouth daily.      GLUCOS-CHONDROIT-COLLAG-HYAL OR Take by mouth.      omega-3 Oral Liquid Take by mouth daily.      multiple vitamin Oral Chew Tab Chew 1 tablet by mouth daily.      Insulin NPH, Human,, Isophane, (NOVOLIN N) 100 UNIT/ML Subcutaneous Suspension Inject 30 Units into the skin daily with breakfast.      Continuous Blood Gluc Sensor (FREESTYLE ARETHA 2 SENSOR) Does not apply Misc 1 Device every 14  (fourteen) days. 6 each 3    Elastic Bandages & Supports (TENNIS ELBOW STRAP) Does not apply Misc Apply to the right arm during the day and take it off for sleeping. For tennis elbow. 1 each 0    Continuous Blood Gluc  (FREESTYLE ARETHA 2 READER) Does not apply Device 1 Device daily. 1 each 0      Past Medical History:    COVID-19 virus detected    Diabetes mellitus (HCC)    Essential hypertension    Hyperglycemia due to type 2 diabetes mellitus (HCC)    Major depression, single episode    Peripheral vascular disease    Pure hypercholesterolemia    Type 1 diabetes mellitus (HCC)    Wears glasses      Past Surgical History:   Procedure Laterality Date    Colonoscopy      Emg  2/11/2025      Family History   Problem Relation Age of Onset    Cancer Mother         Stomach    Diabetes Paternal Grandmother     Diabetes Sister       Social History     Socioeconomic History    Marital status:    Tobacco Use    Smoking status: Never    Smokeless tobacco: Never   Vaping Use    Vaping status: Never Used   Substance and Sexual Activity    Alcohol use: Yes     Alcohol/week: 7.0 standard drinks of alcohol     Types: 7 Shots of liquor per week     Comment: i shot daily    Drug use: Never    Sexual activity: Yes           REVIEW OF SYSTEMS:     Denies nausea, vomiting, fever, chills.      EXAM:   /63 (BP Location: Right arm, Patient Position: Sitting, Cuff Size: adult)   GENERAL: well developed, well nourished, in no apparent distress  EXTREMITIES:   1. Integument: Normal skin temperature and turgor  2. Vascular: Dorsalis pedis two out of four bilateral and posterior tibial pulses two out of   four bilateral, capillary refill normal.   3. Musculoskeletal: All muscle groups are graded 5 out of 5 in the foot and ankle.  Minor bunion deformity to right foot.  No pain with first MPJ to motion.  Minor pain noted to plantar sesamoid/capsule of first MPJ of right foot.  Minor pain with end range of motion.    4.  Neurological: Normal sharp dull sensation; reflexes normal.  Protective sensation intact to digits via 10 g monofilament.          ASSESSMENT AND PLAN:   Diagnoses and all orders for this visit:    Hallux limitus, right  -     triamcinolone acetonide (Kenalog-40) 40 MG/ML injection 20 mg    Capsulitis of toe, right    Paresthesia    Neuritis    Lumbar radiculopathy    Diabetes mellitus type 1, controlled, without complications ([facility])        Plan:    -Patient examined, chart history reviewed.  -Discussed etiology of condition and various treatment options.  -Prior MRI reviewed.  Some arthritic changes to great toe joint but no acute fractures or osseous abnormalities.  -EMG without acute concerns.  -Patient's protective sensation intact to digits via 10 g monofilament but he does have some subjective numbness around his great toe joint.  -Patient does appear to have some pain associated with hallux limitus and/or capsulitis of his right great toe that may be irritating nerve in region.  -X-rays reviewed.  Mild bunion/arthritis to great toe joint but no acute fractures or osseous abnormalities noted.  -Discussed cortisone injection with patient including benefits and risks.  Patient agreeable and written consent was obtained.  After prepping site with alcohol, administer cortisone injection of right great toe joint consisting of 0.5 cc 0.5% Marcaine plain, 0.5 cc of Kenalog 40.  Patient tolerated injection well and there are no complications.  Site was dressed with Band-Aid.  -Patient should ambulate with supportive shoes and avoid walking barefoot.  Informational handout dispensed with recommended shoes and inserts.  -RTC 1 month for reevaluation.  Will have patient obtain second opinion with someone else in her group if symptoms worsen or fail to improve.    The patient indicates understanding of these issues and agrees to the plan.        Tj Haro DPM    Dragon speech recognition software was used to prepare this  note.  Errors in word recognition may occur.  Please contact me with any questions/concerns with this note.

## 2025-05-06 ENCOUNTER — OFFICE VISIT (OUTPATIENT)
Dept: FAMILY MEDICINE CLINIC | Facility: CLINIC | Age: 65
End: 2025-05-06
Payer: COMMERCIAL

## 2025-05-06 VITALS
DIASTOLIC BLOOD PRESSURE: 76 MMHG | OXYGEN SATURATION: 99 % | TEMPERATURE: 98 F | BODY MASS INDEX: 25.16 KG/M2 | SYSTOLIC BLOOD PRESSURE: 116 MMHG | HEART RATE: 66 BPM | HEIGHT: 68 IN | RESPIRATION RATE: 18 BRPM | WEIGHT: 166 LBS

## 2025-05-06 DIAGNOSIS — I10 ESSENTIAL HYPERTENSION: Chronic | ICD-10-CM

## 2025-05-06 DIAGNOSIS — Z13.29 SCREENING FOR THYROID DISORDER: ICD-10-CM

## 2025-05-06 DIAGNOSIS — E78.00 PURE HYPERCHOLESTEROLEMIA: ICD-10-CM

## 2025-05-06 DIAGNOSIS — Z13.0 SCREENING, ANEMIA, DEFICIENCY, IRON: ICD-10-CM

## 2025-05-06 DIAGNOSIS — Z79.4 TYPE 2 DIABETES MELLITUS WITHOUT COMPLICATION, WITH LONG-TERM CURRENT USE OF INSULIN (HCC): Primary | ICD-10-CM

## 2025-05-06 DIAGNOSIS — E11.9 TYPE 2 DIABETES MELLITUS WITHOUT COMPLICATION, WITH LONG-TERM CURRENT USE OF INSULIN (HCC): Primary | ICD-10-CM

## 2025-05-06 PROBLEM — H25.13 AGE-RELATED NUCLEAR CATARACT OF BOTH EYES: Status: ACTIVE | Noted: 2024-10-21

## 2025-05-06 LAB
CREAT UR-SCNC: 148.6 MG/DL
HEMOGLOBIN A1C: 7.1 % (ref 4.3–5.6)
MICROALBUMIN UR-MCNC: 0.5 MG/DL
MICROALBUMIN/CREAT 24H UR-RTO: 3.4 UG/MG (ref ?–30)

## 2025-05-06 PROCEDURE — 82570 ASSAY OF URINE CREATININE: CPT | Performed by: FAMILY MEDICINE

## 2025-05-06 PROCEDURE — 83036 HEMOGLOBIN GLYCOSYLATED A1C: CPT | Performed by: FAMILY MEDICINE

## 2025-05-06 PROCEDURE — 99214 OFFICE O/P EST MOD 30 MIN: CPT | Performed by: FAMILY MEDICINE

## 2025-05-06 PROCEDURE — 82043 UR ALBUMIN QUANTITATIVE: CPT | Performed by: FAMILY MEDICINE

## 2025-05-06 RX ORDER — ROSUVASTATIN CALCIUM 5 MG/1
5 TABLET, COATED ORAL NIGHTLY
Qty: 90 TABLET | Refills: 3 | Status: SHIPPED | OUTPATIENT
Start: 2025-05-06

## 2025-05-06 RX ORDER — LISINOPRIL 10 MG/1
10 TABLET ORAL DAILY
Qty: 90 TABLET | Refills: 3 | Status: SHIPPED | OUTPATIENT
Start: 2025-05-06

## 2025-05-06 NOTE — PATIENT INSTRUCTIONS
Go to the [facility] lab for your fasting blood tests. Do not eat or drink except for water for at least 8 hours prior to the blood tests. Do not take any vitamins or Biotin for 3 days before your blood tests.    Continue your medications as prescribed.    See me in 3 months for your annual physical. Get your labs done the week before.    
Alert-The patient is alert, awake and responds to voice. The patient is oriented to time, place, and person. The triage nurse is able to obtain subjective information.

## 2025-05-06 NOTE — PROGRESS NOTES
The 21st Century Cures Act makes medical notes like these available to patients in the interest of transparency. Please be advised this is a medical document. Medical documents are intended to carry relevant information, facts as evident, and the clinical opinion of the practitioner. The medical note is intended as peer to peer communication and may appear blunt or direct. It is written in medical language and may contain abbreviations or verbiage that are unfamiliar.       Omid Williamson is a 64 year old male.    HPI:     Chief Complaint   Patient presents with    Follow - Up    Hypertension    Diabetes       This 64-year-old male presents to the office for follow-up on his hypertension, diabetes and hypercholesterolemia.  He is currently using his insulin NPH 30 units in the morning and a sliding scale for his regular insulin 3 times a day.  He states, overall, he has rare sugars over 200 and rare hypoglycemic symptoms.  He has not had any dizziness or syncope.  He needs refills on his lisinopril 10 mg daily and rosuvastatin 5 mg nightly.  He is also taking aspirin 81 mg daily and a multivitamin.  He denies any chest pain, palpitations, shortness of breath, leg swelling.  The patient had cataract surgery in September 2024 and December 2024.  He states he has no retinopathy and his vision is doing well after his surgery.  He has no new concerns today.    HISTORY:  Past Medical History[1]   Past Surgical History[2]   Family History[3]   Social History: Short Social Hx on File[4]     Medications (Active prior to today's visit):  Current Medications[5]    Allergies:  Allergies[6]    ROS:     Pertinent positives and pertinent negatives are as listed in HPI.    All other review of symptoms were reviewed and negative.    PHYSICAL EXAM:   /76 (BP Location: Left arm, Patient Position: Sitting, Cuff Size: adult)   Pulse 66   Temp 98.1 °F (36.7 °C)   Resp 18   Ht 5' 8\" (1.727 m)   Wt 166 lb (75.3 kg)   SpO2 99%    BMI 25.24 kg/m²     Wt Readings from Last 3 Encounters:   05/06/25 166 lb (75.3 kg)   06/24/24 168 lb (76.2 kg)   10/24/23 169 lb (76.7 kg)       BP Readings from Last 3 Encounters:   05/06/25 116/76   03/17/25 132/64   08/21/24 134/82     The weight is down 3 pounds from his 10/24/2023 office visit.    General: Well-nourished, well hydrated. No acute distress. No pallor.   HEENT: Normocephalic, atraumatic.  JANEEN, EOMI, Sclera clear and non icteric bilaterally. TM's normal, nose without congestion, pharynx without redness or exudates. Moist mucous membranes.  Neck: Supple. No lymphadenopathy. No thyromegaly. No bruits noted.  Heart: RRR without S3 or S4 or murmur.  Lungs: Clear to auscultation bilaterally. No rales, rhonchi or wheezes. No tachypnea or retractions noted.  Abdomen: Soft, nontender, nondistended, normal bowel sounds. No organomegaly. No guarding, rigidity or rebound noted.  Extremities: No edema bilaterally.  Skin: Warm and dry.  Neuro: Alert and oriented x 3.normal gait.  Psych: Normal mood and affect.     ASSESSMENT/PLAN:   64 year old male with      1. Type 2 diabetes mellitus without complication, with long-term current use of insulin ([facility])    Lab Results   Component Value Date    A1C 7.1 (A) 05/06/2025    A1C 7.5 (H) 06/29/2024    A1C 7.1 (A) 10/24/2023    A1C 7.2 (H) 05/13/2023    A1C 7.7 (H) 10/17/2022      I discussed the results of today's hemoglobin A1c with the patient.  His diabetes is under good control.  Urine for microalbumin was obtained.  His diabetic eye exam was done in October 2024 and showed no retinopathy.  He is due for his labs.  He should continue with his current regimen of insulin.    - POC Hemoglobin A1C  - Microalb/Creat Ratio, Random Urine E  - Comp Metabolic Panel (14)    2. Essential hypertension    Patient's blood pressure is well-controlled on a refill of his lisinopril 10 mg daily.    - lisinopril 10 MG Oral Tab; Take 1 tablet (10 mg total) by mouth daily.   Dispense: 90 tablet; Refill: 3    3. Pure hypercholesterolemia    Cholesterol:     Lab Results   Component Value Date    CHOLEST 168 06/29/2024    CHOLEST 175 05/13/2023    CHOLEST 154 04/30/2022     Lab Results   Component Value Date    HDL 67 06/29/2024    HDL 66 05/13/2023    HDL 57 04/30/2022     Lab Results   Component Value Date    TRIG 67 06/29/2024    TRIG 69 05/13/2023    TRIG 91 04/30/2022     Lab Results   Component Value Date    LDL 86 06/29/2024    LDL 93 05/13/2023    LDL 80 04/30/2022     Lab Results   Component Value Date    AST 21 06/29/2024    AST 19 05/13/2023    AST 23 10/17/2022     Lab Results   Component Value Date    ALT 19 06/29/2024    ALT 20 05/13/2023    ALT 30 10/17/2022     Lipids are at goal.  I refilled his rosuvastatin 5 mg nightly.  He is due for his lipid panel.    - Lipid Panel  - rosuvastatin 5 MG Oral Tab; Take 1 tablet (5 mg total) by mouth nightly.  Dispense: 90 tablet; Refill: 3    4. Screening, anemia, deficiency, iron    - CBC With Differential With Platelet    5. Screening for thyroid disorder    - TSH W Reflex To Free T4     6. Health maintenance    Patient to return in 3 months for his annual physical.    Health Maintenance:    Health Maintenance   Topic Date Due    COVID-19 Vaccine (5 - 2024-25 season) 09/01/2024    Diabetes Care: Microalb/Creat Ratio (Annual)  01/01/2025    Annual Physical  06/24/2025    Diabetes Care Foot Exam  06/24/2025    Diabetes Care: GFR  06/29/2025    Influenza Vaccine (Season Ended) 10/01/2025    Diabetes Care Dilated Eye Exam  10/10/2025    Diabetes Care A1C  11/06/2025    PSA  06/29/2026    Colorectal Cancer Screening  06/27/2032    Annual Depression Screening  Completed    Fall Risk Screening (Annual)  Completed    Pneumococcal Vaccine: 50+ Years  Completed    Zoster Vaccines  Completed    Meningococcal B Vaccine  Aged Out         Meds This Visit:  Requested Prescriptions     Signed Prescriptions Disp Refills    lisinopril 10 MG Oral Tab 90  tablet 3     Sig: Take 1 tablet (10 mg total) by mouth daily.    rosuvastatin 5 MG Oral Tab 90 tablet 3     Sig: Take 1 tablet (5 mg total) by mouth nightly.       Imaging & Referrals:  None     Patient understands plan and follow-up.  Follow up in 3 months for annual physical.    5/6/2025  Magaly Dangelo DO    This dictation was performed with a verbal recognition program (DRAGON) and it was checked for errors. It is possible that there are still dictated errors within this office note. If so, please bring any errors to my attention for an addendum. All efforts were made to ensure that this office note is accurate           [1]   Past Medical History:   COVID-19 virus detected    Diabetes mellitus (HCC)    Essential hypertension    Hyperglycemia due to type 2 diabetes mellitus (HCC)    Major depression, single episode    Peripheral vascular disease    Pure hypercholesterolemia    Type 1 diabetes mellitus (HCC)    Wears glasses   [2]   Past Surgical History:  Procedure Laterality Date    Colonoscopy      Emg  2/11/2025   [3]   Family History  Problem Relation Age of Onset    Cancer Mother         Stomach    Diabetes Paternal Grandmother     Diabetes Sister    [4]   Social History  Socioeconomic History    Marital status:    Tobacco Use    Smoking status: Never    Smokeless tobacco: Never   Vaping Use    Vaping status: Never Used   Substance and Sexual Activity    Alcohol use: Yes     Alcohol/week: 7.0 standard drinks of alcohol     Types: 7 Shots of liquor per week     Comment: i shot daily    Drug use: Never    Sexual activity: Yes   [5]   Current Outpatient Medications   Medication Sig Dispense Refill    lisinopril 10 MG Oral Tab Take 1 tablet (10 mg total) by mouth daily. 90 tablet 3    rosuvastatin 5 MG Oral Tab Take 1 tablet (5 mg total) by mouth nightly. 90 tablet 3    Continuous Glucose Sensor (FREESTYLE ARETHA 3 PLUS SENSOR) Does not apply Misc 1 each every 14 (fourteen) days. 4 each 3    insulin  regular human (HUMULIN R) 100 UNIT/ML Injection Solution INJECT INTO THE SKIN THREE TIMES A DAY ACCORDING TO YOUR SLIDING SCALE 30 mL 0    GLUCOS-CHONDROIT-COLLAG-HYAL OR Take by mouth.      omega-3 Oral Liquid Take by mouth daily.      multiple vitamin Oral Chew Tab Chew 1 tablet by mouth daily.      Insulin NPH, Human,, Isophane, (NOVOLIN N) 100 UNIT/ML Subcutaneous Suspension Inject 30 Units into the skin daily with breakfast.      aspirin 81 MG Oral Chew Tab Chew 1 tablet (81 mg total) by mouth daily.     [6] No Known Allergies

## 2025-06-01 LAB
ABSOLUTE BASOPHILS: 20 CELLS/UL (ref 0–200)
ABSOLUTE EOSINOPHILS: 160 CELLS/UL (ref 15–500)
ABSOLUTE LYMPHOCYTES: 1472 CELLS/UL (ref 850–3900)
ABSOLUTE MONOCYTES: 372 CELLS/UL (ref 200–950)
ABSOLUTE NEUTROPHILS: 1976 CELLS/UL (ref 1500–7800)
ALBUMIN/GLOBULIN RATIO: 2 (CALC) (ref 1–2.5)
ALBUMIN: 4.5 G/DL (ref 3.6–5.1)
ALKALINE PHOSPHATASE: 81 U/L (ref 35–144)
ALT: 21 U/L (ref 9–46)
AST: 22 U/L (ref 10–35)
BASOPHILS: 0.5 %
BILIRUBIN, TOTAL: 0.7 MG/DL (ref 0.2–1.2)
BUN: 25 MG/DL (ref 7–25)
CALCIUM: 9.6 MG/DL (ref 8.6–10.3)
CARBON DIOXIDE: 32 MMOL/L (ref 20–32)
CHLORIDE: 104 MMOL/L (ref 98–110)
CHOL/HDLC RATIO: 2.7 (CALC)
CHOLESTEROL, TOTAL: 175 MG/DL
CREATININE: 1.02 MG/DL (ref 0.7–1.35)
EGFR: 82 ML/MIN/1.73M2
EOSINOPHILS: 4 %
GLOBULIN: 2.3 G/DL (CALC) (ref 1.9–3.7)
GLUCOSE: 120 MG/DL (ref 65–99)
HDL CHOLESTEROL: 65 MG/DL
HEMATOCRIT: 49.6 % (ref 38.5–50)
HEMOGLOBIN: 15.7 G/DL (ref 13.2–17.1)
LDL-CHOLESTEROL: 92 MG/DL (CALC)
LYMPHOCYTES: 36.8 %
MCH: 29.3 PG (ref 27–33)
MCHC: 31.7 G/DL (ref 32–36)
MCV: 92.7 FL (ref 80–100)
MONOCYTES: 9.3 %
MPV: 10.8 FL (ref 7.5–12.5)
NEUTROPHILS: 49.4 %
NON-HDL CHOLESTEROL: 110 MG/DL (CALC)
PLATELET COUNT: 240 THOUSAND/UL (ref 140–400)
POTASSIUM: 5.6 MMOL/L (ref 3.5–5.3)
PROTEIN, TOTAL: 6.8 G/DL (ref 6.1–8.1)
RDW: 13.4 % (ref 11–15)
RED BLOOD CELL COUNT: 5.35 MILLION/UL (ref 4.2–5.8)
SODIUM: 142 MMOL/L (ref 135–146)
TRIGLYCERIDES: 88 MG/DL
TSH W/REFLEX TO FT4: 2.22 MIU/L (ref 0.4–4.5)
WHITE BLOOD CELL COUNT: 4 THOUSAND/UL (ref 3.8–10.8)

## (undated) DIAGNOSIS — E87.5 HYPERKALEMIA: Primary | ICD-10-CM

## (undated) DIAGNOSIS — E11.65 TYPE 2 DIABETES MELLITUS WITH HYPERGLYCEMIA, WITH LONG-TERM CURRENT USE OF INSULIN (HCC): Primary | ICD-10-CM

## (undated) DIAGNOSIS — E11.65 TYPE 2 DIABETES MELLITUS WITH HYPERGLYCEMIA, WITH LONG-TERM CURRENT USE OF INSULIN (HCC): ICD-10-CM

## (undated) DIAGNOSIS — Z79.4 TYPE 2 DIABETES MELLITUS WITH HYPERGLYCEMIA, WITH LONG-TERM CURRENT USE OF INSULIN (HCC): ICD-10-CM

## (undated) DIAGNOSIS — R73.9 HYPERGLYCEMIA: ICD-10-CM

## (undated) DIAGNOSIS — R79.9 ELEVATED BUN: ICD-10-CM

## (undated) DIAGNOSIS — Z79.4 TYPE 2 DIABETES MELLITUS WITH HYPERGLYCEMIA, WITH LONG-TERM CURRENT USE OF INSULIN (HCC): Primary | ICD-10-CM

## (undated) NOTE — LETTER
AUTHORIZATION FOR SURGICAL OPERATION OR OTHER PROCEDURE    1. I hereby authorize Dr. Hsieh and [facility] staff assigned to my case to perform the following operation and/or procedure at the [facility] site:    ____Cortisone injection right foot ___________________________________________________________________________________________      _______________________________________________________________________________________________    2.  My physician has explained the nature and purpose of the operation or other procedure, possible alternative methods of treatment, the risks involved, and the possibility of complication to me.  I acknowledge that no guarantee has been made as to the result that may be obtained.  3.  I recognize that, during the course of this operation, or other procedure, unforseen conditions may necessitate additional or different procedure than those listed above.  I, therefore, further authorize and request that the above named physician, his/her physician assistants or designees perform such procedures as are, in his/her professional opinion, necessary and desirable.  4.  Any tissue or organs removed in the operation or other procedure may be disposed of by and at the discretion of the [facility] and [facility].  5.  I understand that in the event of a medical emergency, I will be transported by local paramedics to [facility] or other hospital emergency department.  6.  I certify that I have read and fully understand the above consent to operation and/or other procedure.    7.  I acknowledge that my physician has explained sedation/analgesia administration to me including the risks and benefits.  I consent to the administration of sedation/analgesia as may be necessary or desirable in the judgement of my physician.    Witness signature: ___________________________________________________ Date:  ______/______/_____                     Time:  ________ A.M.  P.M.       Patient Name:  ______________________________________________________  (please print)      Patient signature:  ___________________________________________________             Relationship to Patient:           []  Parent    Responsible person                          []  Spouse  In case of minor or                    [] Other  _____________   Incompetent name:  __________________________________________________                               (please print)      _____________      Responsible person  In case of minor or  Incompetent signature:  _______________________________________________    Statement of Physician  My signature below affirms that prior to the time of the procedure, I have explained to the patient and/or his/her guardian, the risks and benefits involved in the proposed treatment and any reasonable alternative to the proposed treatment.  I have also explained the risks and benefits involved in the refusal of the proposed treatment and have answered the patient's questions.                        Date:  ______/______/_______  Provider                      Signature:  __________________________________________________________       Time:  ___________ A.M    P.M.

## (undated) NOTE — LETTER
AUTHORIZATION FOR SURGICAL OPERATION OR OTHER PROCEDURE    1. I hereby authorize Dr. Tj Haro, and [facility] staff assigned to my case to perform the following operation and/or procedure at the [facility] site:    _______________________________________________________________________________________________    Cortisone Injection Right Hallux  _______________________________________________________________________________________________    2.  My physician has explained the nature and purpose of the operation or other procedure, possible alternative methods of treatment, the risks involved, and the possibility of complication to me.  I acknowledge that no guarantee has been made as to the result that may be obtained.  3.  I recognize that, during the course of this operation, or other procedure, unforseen conditions may necessitate additional or different procedure than those listed above.  I, therefore, further authorize and request that the above named physician, his/her physician assistants or designees perform such procedures as are, in his/her professional opinion, necessary and desirable.  4.  Any tissue or organs removed in the operation or other procedure may be disposed of by and at the discretion of the [facility] and [facility].  5.  I understand that in the event of a medical emergency, I will be transported by local paramedics to [facility] or other hospital emergency department.  6.  I certify that I have read and fully understand the above consent to operation and/or other procedure.    7.  I acknowledge that my physician has explained sedation/analgesia administration to me including the risks and benefits.  I consent to the administration of sedation/analgesia as may be necessary or desirable in the judgement of my physician.    Witness signature: ___________________________________________________ Date:   ______/______/_____                    Time:  ________ A.M.  P.M.       Patient Name:  ______________________________________________________  (please print)      Patient signature:  ___________________________________________________             Relationship to Patient:           []  Parent    Responsible person                          []  Spouse  In case of minor or                    [] Other  _____________   Incompetent name:  __________________________________________________                               (please print)      _____________      Responsible person  In case of minor or  Incompetent signature:  _______________________________________________    Statement of Physician  My signature below affirms that prior to the time of the procedure, I have explained to the patient and/or his/her guardian, the risks and benefits involved in the proposed treatment and any reasonable alternative to the proposed treatment.  I have also explained the risks and benefits involved in the refusal of the proposed treatment and have answered the patient's questions.                        Date:  ______/______/_______  Provider                      Signature:  __________________________________________________________       Time:  ___________ A.M    P.M.

## (undated) NOTE — LETTER
AUTHORIZATION FOR SURGICAL OPERATION OR OTHER PROCEDURE    1. I hereby authorize Dr. Tj Haro , and [facility] staff assigned to my case to perform the following operation and/or procedure at the [facility] site:    _______________________________________________________________________________________________    Cortisone Injection Right Foot   _______________________________________________________________________________________________    2.  My physician has explained the nature and purpose of the operation or other procedure, possible alternative methods of treatment, the risks involved, and the possibility of complication to me.  I acknowledge that no guarantee has been made as to the result that may be obtained.  3.  I recognize that, during the course of this operation, or other procedure, unforseen conditions may necessitate additional or different procedure than those listed above.  I, therefore, further authorize and request that the above named physician, his/her physician assistants or designees perform such procedures as are, in his/her professional opinion, necessary and desirable.  4.  Any tissue or organs removed in the operation or other procedure may be disposed of by and at the discretion of the [facility] and [facility].  5.  I understand that in the event of a medical emergency, I will be transported by local paramedics to [facility] or other hospital emergency department.  6.  I certify that I have read and fully understand the above consent to operation and/or other procedure.    7.  I acknowledge that my physician has explained sedation/analgesia administration to me including the risks and benefits.  I consent to the administration of sedation/analgesia as may be necessary or desirable in the judgement of my physician.    Witness signature: ___________________________________________________ Date:   ______/______/_____                    Time:  ________ A.M.  P.M.       Patient Name:  ______________________________________________________  (please print)      Patient signature:  ___________________________________________________             Relationship to Patient:           []  Parent    Responsible person                          []  Spouse  In case of minor or                    [] Other  _____________   Incompetent name:  __________________________________________________                               (please print)      _____________      Responsible person  In case of minor or  Incompetent signature:  _______________________________________________    Statement of Physician  My signature below affirms that prior to the time of the procedure, I have explained to the patient and/or his/her guardian, the risks and benefits involved in the proposed treatment and any reasonable alternative to the proposed treatment.  I have also explained the risks and benefits involved in the refusal of the proposed treatment and have answered the patient's questions.                        Date:  ______/______/_______  Provider                      Signature:  __________________________________________________________       Time:  ___________ A.M    P.M.

## (undated) NOTE — LETTER
05/05/22    May 5, 2022        Damien Edward [address] 11      Dear Reyna Kent: We have been unable to reach you by phone. In an effort to provide quality patient care we are reaching out to you to contact the office at your earliest convenience regarding your recent lab work. Please call the office. If you have recently contacted us back regarding your lab results,  please disregard this letter. You may call the office at [phone] ASAP. Thank you.     The office of Dr. Madhu Persaud